# Patient Record
Sex: FEMALE | Race: WHITE | NOT HISPANIC OR LATINO | Employment: OTHER | URBAN - METROPOLITAN AREA
[De-identification: names, ages, dates, MRNs, and addresses within clinical notes are randomized per-mention and may not be internally consistent; named-entity substitution may affect disease eponyms.]

---

## 2024-08-08 ENCOUNTER — HOSPITAL ENCOUNTER (OUTPATIENT)
Dept: RADIOLOGY | Facility: MEDICAL CENTER | Age: 84
End: 2024-08-08

## 2024-08-08 ENCOUNTER — HOSPITAL ENCOUNTER (INPATIENT)
Facility: MEDICAL CENTER | Age: 84
LOS: 3 days | DRG: 419 | End: 2024-08-11
Attending: STUDENT IN AN ORGANIZED HEALTH CARE EDUCATION/TRAINING PROGRAM | Admitting: STUDENT IN AN ORGANIZED HEALTH CARE EDUCATION/TRAINING PROGRAM

## 2024-08-08 DIAGNOSIS — K80.50 CHOLEDOCHOLITHIASIS: ICD-10-CM

## 2024-08-08 PROCEDURE — 770006 HCHG ROOM/CARE - MED/SURG/GYN SEMI*

## 2024-08-08 SDOH — ECONOMIC STABILITY: TRANSPORTATION INSECURITY
IN THE PAST 12 MONTHS, HAS LACK OF RELIABLE TRANSPORTATION KEPT YOU FROM MEDICAL APPOINTMENTS, MEETINGS, WORK OR FROM GETTING THINGS NEEDED FOR DAILY LIVING?: NO

## 2024-08-08 SDOH — ECONOMIC STABILITY: TRANSPORTATION INSECURITY
IN THE PAST 12 MONTHS, HAS THE LACK OF TRANSPORTATION KEPT YOU FROM MEDICAL APPOINTMENTS OR FROM GETTING MEDICATIONS?: NO

## 2024-08-08 ASSESSMENT — PATIENT HEALTH QUESTIONNAIRE - PHQ9
3. TROUBLE FALLING OR STAYING ASLEEP OR SLEEPING TOO MUCH: NOT AT ALL
SUM OF ALL RESPONSES TO PHQ9 QUESTIONS 1 AND 2: 1
2. FEELING DOWN, DEPRESSED, IRRITABLE, OR HOPELESS: SEVERAL DAYS
7. TROUBLE CONCENTRATING ON THINGS, SUCH AS READING THE NEWSPAPER OR WATCHING TELEVISION: NOT AT ALL
SUM OF ALL RESPONSES TO PHQ QUESTIONS 1-9: 1
4. FEELING TIRED OR HAVING LITTLE ENERGY: NOT AT ALL
9. THOUGHTS THAT YOU WOULD BE BETTER OFF DEAD, OR OF HURTING YOURSELF: NOT AT ALL
8. MOVING OR SPEAKING SO SLOWLY THAT OTHER PEOPLE COULD HAVE NOTICED. OR THE OPPOSITE, BEING SO FIGETY OR RESTLESS THAT YOU HAVE BEEN MOVING AROUND A LOT MORE THAN USUAL: NOT AT ALL
1. LITTLE INTEREST OR PLEASURE IN DOING THINGS: NOT AT ALL
5. POOR APPETITE OR OVEREATING: NOT AT ALL
6. FEELING BAD ABOUT YOURSELF - OR THAT YOU ARE A FAILURE OR HAVE LET YOURSELF OR YOUR FAMILY DOWN: NOT AL ALL

## 2024-08-08 ASSESSMENT — COGNITIVE AND FUNCTIONAL STATUS - GENERAL
DAILY ACTIVITIY SCORE: 24
SUGGESTED CMS G CODE MODIFIER DAILY ACTIVITY: CH
SUGGESTED CMS G CODE MODIFIER MOBILITY: CH
MOBILITY SCORE: 24

## 2024-08-08 ASSESSMENT — LIFESTYLE VARIABLES
HAVE PEOPLE ANNOYED YOU BY CRITICIZING YOUR DRINKING: NO
DOES PATIENT WANT TO STOP DRINKING: NO
ALCOHOL_USE: NO
CONSUMPTION TOTAL: INCOMPLETE
ON A TYPICAL DAY WHEN YOU DRINK ALCOHOL HOW MANY DRINKS DO YOU HAVE: 0
EVER HAD A DRINK FIRST THING IN THE MORNING TO STEADY YOUR NERVES TO GET RID OF A HANGOVER: NO
AVERAGE NUMBER OF DAYS PER WEEK YOU HAVE A DRINK CONTAINING ALCOHOL: 0
HOW MANY TIMES IN THE PAST YEAR HAVE YOU HAD 5 OR MORE DRINKS IN A DAY: 0
HAVE YOU EVER FELT YOU SHOULD CUT DOWN ON YOUR DRINKING: NO

## 2024-08-08 ASSESSMENT — SOCIAL DETERMINANTS OF HEALTH (SDOH)

## 2024-08-08 ASSESSMENT — PAIN DESCRIPTION - PAIN TYPE: TYPE: ACUTE PAIN

## 2024-08-09 ENCOUNTER — ANESTHESIA EVENT (OUTPATIENT)
Dept: SURGERY | Facility: MEDICAL CENTER | Age: 84
DRG: 419 | End: 2024-08-09

## 2024-08-09 ENCOUNTER — ANESTHESIA (OUTPATIENT)
Dept: SURGERY | Facility: MEDICAL CENTER | Age: 84
DRG: 419 | End: 2024-08-09

## 2024-08-09 ENCOUNTER — APPOINTMENT (OUTPATIENT)
Dept: RADIOLOGY | Facility: MEDICAL CENTER | Age: 84
DRG: 419 | End: 2024-08-09
Attending: STUDENT IN AN ORGANIZED HEALTH CARE EDUCATION/TRAINING PROGRAM

## 2024-08-09 ENCOUNTER — APPOINTMENT (OUTPATIENT)
Dept: RADIOLOGY | Facility: MEDICAL CENTER | Age: 84
DRG: 419 | End: 2024-08-09
Attending: INTERNAL MEDICINE

## 2024-08-09 PROBLEM — K80.50 CHOLEDOCHOLITHIASIS: Status: ACTIVE | Noted: 2024-08-09

## 2024-08-09 PROBLEM — Z71.89 ACP (ADVANCE CARE PLANNING): Status: ACTIVE | Noted: 2024-08-09

## 2024-08-09 PROBLEM — K81.9 CHOLECYSTITIS: Status: ACTIVE | Noted: 2024-08-09

## 2024-08-09 LAB
ALBUMIN SERPL BCP-MCNC: 3.6 G/DL (ref 3.2–4.9)
ALBUMIN/GLOB SERPL: 1.1 G/DL
ALP SERPL-CCNC: 521 U/L (ref 30–99)
ALT SERPL-CCNC: 203 U/L (ref 2–50)
ANION GAP SERPL CALC-SCNC: 12 MMOL/L (ref 7–16)
AST SERPL-CCNC: 109 U/L (ref 12–45)
BASOPHILS # BLD AUTO: 0.7 % (ref 0–1.8)
BASOPHILS # BLD: 0.05 K/UL (ref 0–0.12)
BILIRUB SERPL-MCNC: 1.6 MG/DL (ref 0.1–1.5)
BUN SERPL-MCNC: 9 MG/DL (ref 8–22)
CALCIUM ALBUM COR SERPL-MCNC: 9.2 MG/DL (ref 8.5–10.5)
CALCIUM SERPL-MCNC: 8.9 MG/DL (ref 8.5–10.5)
CHLORIDE SERPL-SCNC: 109 MMOL/L (ref 96–112)
CO2 SERPL-SCNC: 19 MMOL/L (ref 20–33)
CREAT SERPL-MCNC: 0.71 MG/DL (ref 0.5–1.4)
EKG IMPRESSION: NORMAL
EOSINOPHIL # BLD AUTO: 0.14 K/UL (ref 0–0.51)
EOSINOPHIL NFR BLD: 1.8 % (ref 0–6.9)
ERYTHROCYTE [DISTWIDTH] IN BLOOD BY AUTOMATED COUNT: 44.4 FL (ref 35.9–50)
GFR SERPLBLD CREATININE-BSD FMLA CKD-EPI: 84 ML/MIN/1.73 M 2
GLOBULIN SER CALC-MCNC: 3.3 G/DL (ref 1.9–3.5)
GLUCOSE SERPL-MCNC: 110 MG/DL (ref 65–99)
HCT VFR BLD AUTO: 37.5 % (ref 37–47)
HGB BLD-MCNC: 12.1 G/DL (ref 12–16)
IMM GRANULOCYTES # BLD AUTO: 0.08 K/UL (ref 0–0.11)
IMM GRANULOCYTES NFR BLD AUTO: 1 % (ref 0–0.9)
LACTATE SERPL-SCNC: 1.3 MMOL/L (ref 0.5–2)
LIPASE SERPL-CCNC: 34 U/L (ref 11–82)
LYMPHOCYTES # BLD AUTO: 1.35 K/UL (ref 1–4.8)
LYMPHOCYTES NFR BLD: 17.6 % (ref 22–41)
MCH RBC QN AUTO: 28.7 PG (ref 27–33)
MCHC RBC AUTO-ENTMCNC: 32.3 G/DL (ref 32.2–35.5)
MCV RBC AUTO: 88.9 FL (ref 81.4–97.8)
MONOCYTES # BLD AUTO: 0.58 K/UL (ref 0–0.85)
MONOCYTES NFR BLD AUTO: 7.6 % (ref 0–13.4)
NEUTROPHILS # BLD AUTO: 5.47 K/UL (ref 1.82–7.42)
NEUTROPHILS NFR BLD: 71.3 % (ref 44–72)
NRBC # BLD AUTO: 0 K/UL
NRBC BLD-RTO: 0 /100 WBC (ref 0–0.2)
PLATELET # BLD AUTO: 260 K/UL (ref 164–446)
PMV BLD AUTO: 8.9 FL (ref 9–12.9)
POTASSIUM SERPL-SCNC: 4 MMOL/L (ref 3.6–5.5)
PROT SERPL-MCNC: 6.9 G/DL (ref 6–8.2)
RBC # BLD AUTO: 4.22 M/UL (ref 4.2–5.4)
SODIUM SERPL-SCNC: 140 MMOL/L (ref 135–145)
WBC # BLD AUTO: 7.7 K/UL (ref 4.8–10.8)

## 2024-08-09 PROCEDURE — 93010 ELECTROCARDIOGRAM REPORT: CPT | Performed by: INTERNAL MEDICINE

## 2024-08-09 PROCEDURE — 700105 HCHG RX REV CODE 258: Performed by: STUDENT IN AN ORGANIZED HEALTH CARE EDUCATION/TRAINING PROGRAM

## 2024-08-09 PROCEDURE — 99223 1ST HOSP IP/OBS HIGH 75: CPT | Performed by: STUDENT IN AN ORGANIZED HEALTH CARE EDUCATION/TRAINING PROGRAM

## 2024-08-09 PROCEDURE — 93005 ELECTROCARDIOGRAM TRACING: CPT | Performed by: GENERAL PRACTICE

## 2024-08-09 PROCEDURE — 87040 BLOOD CULTURE FOR BACTERIA: CPT

## 2024-08-09 PROCEDURE — 160036 HCHG PACU - EA ADDL 30 MINS PHASE I: Performed by: INTERNAL MEDICINE

## 2024-08-09 PROCEDURE — 160035 HCHG PACU - 1ST 60 MINS PHASE I: Performed by: INTERNAL MEDICINE

## 2024-08-09 PROCEDURE — 700102 HCHG RX REV CODE 250 W/ 637 OVERRIDE(OP)

## 2024-08-09 PROCEDURE — 83605 ASSAY OF LACTIC ACID: CPT

## 2024-08-09 PROCEDURE — 160048 HCHG OR STATISTICAL LEVEL 1-5: Performed by: INTERNAL MEDICINE

## 2024-08-09 PROCEDURE — C1769 GUIDE WIRE: HCPCS | Performed by: INTERNAL MEDICINE

## 2024-08-09 PROCEDURE — 700101 HCHG RX REV CODE 250: Performed by: STUDENT IN AN ORGANIZED HEALTH CARE EDUCATION/TRAINING PROGRAM

## 2024-08-09 PROCEDURE — 74181 MRI ABDOMEN W/O CONTRAST: CPT

## 2024-08-09 PROCEDURE — 83690 ASSAY OF LIPASE: CPT

## 2024-08-09 PROCEDURE — 43264 ERCP REMOVE DUCT CALCULI: CPT | Performed by: INTERNAL MEDICINE

## 2024-08-09 PROCEDURE — A9270 NON-COVERED ITEM OR SERVICE: HCPCS

## 2024-08-09 PROCEDURE — 700101 HCHG RX REV CODE 250: Performed by: ANESTHESIOLOGY

## 2024-08-09 PROCEDURE — 160002 HCHG RECOVERY MINUTES (STAT): Performed by: INTERNAL MEDICINE

## 2024-08-09 PROCEDURE — 99254 IP/OBS CNSLTJ NEW/EST MOD 60: CPT | Mod: 57 | Performed by: SURGERY

## 2024-08-09 PROCEDURE — 700105 HCHG RX REV CODE 258: Performed by: GENERAL PRACTICE

## 2024-08-09 PROCEDURE — 80053 COMPREHEN METABOLIC PANEL: CPT

## 2024-08-09 PROCEDURE — 36415 COLL VENOUS BLD VENIPUNCTURE: CPT

## 2024-08-09 PROCEDURE — C1889 IMPLANT/INSERT DEVICE, NOC: HCPCS | Performed by: INTERNAL MEDICINE

## 2024-08-09 PROCEDURE — 99253 IP/OBS CNSLTJ NEW/EST LOW 45: CPT | Mod: 25 | Performed by: INTERNAL MEDICINE

## 2024-08-09 PROCEDURE — 700105 HCHG RX REV CODE 258: Performed by: ANESTHESIOLOGY

## 2024-08-09 PROCEDURE — 74328 X-RAY BILE DUCT ENDOSCOPY: CPT

## 2024-08-09 PROCEDURE — 770006 HCHG ROOM/CARE - MED/SURG/GYN SEMI*

## 2024-08-09 PROCEDURE — 700111 HCHG RX REV CODE 636 W/ 250 OVERRIDE (IP): Performed by: STUDENT IN AN ORGANIZED HEALTH CARE EDUCATION/TRAINING PROGRAM

## 2024-08-09 PROCEDURE — 700111 HCHG RX REV CODE 636 W/ 250 OVERRIDE (IP): Performed by: ANESTHESIOLOGY

## 2024-08-09 PROCEDURE — 85025 COMPLETE CBC W/AUTO DIFF WBC: CPT

## 2024-08-09 PROCEDURE — 700117 HCHG RX CONTRAST REV CODE 255: Performed by: INTERNAL MEDICINE

## 2024-08-09 PROCEDURE — 160028 HCHG SURGERY MINUTES - 1ST 30 MINS LEVEL 3: Performed by: INTERNAL MEDICINE

## 2024-08-09 PROCEDURE — 0FC98ZZ EXTIRPATION OF MATTER FROM COMMON BILE DUCT, VIA NATURAL OR ARTIFICIAL OPENING ENDOSCOPIC: ICD-10-PCS | Performed by: INTERNAL MEDICINE

## 2024-08-09 PROCEDURE — 160009 HCHG ANES TIME/MIN: Performed by: INTERNAL MEDICINE

## 2024-08-09 RX ORDER — INDOMETHACIN 100 MG
SUPPOSITORY, RECTAL RECTAL
Status: COMPLETED
Start: 2024-08-09 | End: 2024-08-09

## 2024-08-09 RX ORDER — ALBUTEROL SULFATE 5 MG/ML
2.5 SOLUTION RESPIRATORY (INHALATION)
Status: DISCONTINUED | OUTPATIENT
Start: 2024-08-09 | End: 2024-08-09 | Stop reason: HOSPADM

## 2024-08-09 RX ORDER — HYDROMORPHONE HYDROCHLORIDE 1 MG/ML
1 INJECTION, SOLUTION INTRAMUSCULAR; INTRAVENOUS; SUBCUTANEOUS EVERY 4 HOURS PRN
Status: DISCONTINUED | OUTPATIENT
Start: 2024-08-09 | End: 2024-08-09

## 2024-08-09 RX ORDER — LABETALOL HYDROCHLORIDE 5 MG/ML
10 INJECTION, SOLUTION INTRAVENOUS EVERY 4 HOURS PRN
Status: DISCONTINUED | OUTPATIENT
Start: 2024-08-09 | End: 2024-08-11 | Stop reason: HOSPADM

## 2024-08-09 RX ORDER — ONDANSETRON 2 MG/ML
4 INJECTION INTRAMUSCULAR; INTRAVENOUS EVERY 4 HOURS PRN
Status: DISCONTINUED | OUTPATIENT
Start: 2024-08-09 | End: 2024-08-11 | Stop reason: HOSPADM

## 2024-08-09 RX ORDER — METRONIDAZOLE 500 MG/100ML
500 INJECTION, SOLUTION INTRAVENOUS EVERY 12 HOURS
Status: DISCONTINUED | OUTPATIENT
Start: 2024-08-09 | End: 2024-08-11 | Stop reason: HOSPADM

## 2024-08-09 RX ORDER — INDOMETHACIN 100 MG
100 SUPPOSITORY, RECTAL RECTAL ONCE
Status: COMPLETED | OUTPATIENT
Start: 2024-08-09 | End: 2024-08-09

## 2024-08-09 RX ORDER — OXYCODONE HCL 5 MG/5 ML
5 SOLUTION, ORAL ORAL
Status: DISCONTINUED | OUTPATIENT
Start: 2024-08-09 | End: 2024-08-09 | Stop reason: HOSPADM

## 2024-08-09 RX ORDER — OXYCODONE HYDROCHLORIDE 10 MG/1
10 TABLET ORAL
Status: DISCONTINUED | OUTPATIENT
Start: 2024-08-09 | End: 2024-08-11 | Stop reason: HOSPADM

## 2024-08-09 RX ORDER — OXYCODONE HCL 5 MG/5 ML
7.5 SOLUTION, ORAL ORAL
Status: DISCONTINUED | OUTPATIENT
Start: 2024-08-09 | End: 2024-08-09 | Stop reason: HOSPADM

## 2024-08-09 RX ORDER — ONDANSETRON 2 MG/ML
4 INJECTION INTRAMUSCULAR; INTRAVENOUS EVERY 4 HOURS PRN
Status: ACTIVE | OUTPATIENT
Start: 2024-08-09 | End: 2024-08-09

## 2024-08-09 RX ORDER — HYDROMORPHONE HYDROCHLORIDE 1 MG/ML
0.5 INJECTION, SOLUTION INTRAMUSCULAR; INTRAVENOUS; SUBCUTANEOUS
Status: DISCONTINUED | OUTPATIENT
Start: 2024-08-09 | End: 2024-08-11 | Stop reason: HOSPADM

## 2024-08-09 RX ORDER — ONDANSETRON 4 MG/1
4 TABLET, ORALLY DISINTEGRATING ORAL EVERY 4 HOURS PRN
Status: DISCONTINUED | OUTPATIENT
Start: 2024-08-09 | End: 2024-08-11 | Stop reason: HOSPADM

## 2024-08-09 RX ORDER — LIDOCAINE HYDROCHLORIDE 20 MG/ML
INJECTION, SOLUTION EPIDURAL; INFILTRATION; INTRACAUDAL; PERINEURAL PRN
Status: DISCONTINUED | OUTPATIENT
Start: 2024-08-09 | End: 2024-08-09 | Stop reason: SURG

## 2024-08-09 RX ORDER — SODIUM CHLORIDE 9 MG/ML
INJECTION, SOLUTION INTRAVENOUS CONTINUOUS
Status: DISCONTINUED | OUTPATIENT
Start: 2024-08-09 | End: 2024-08-10

## 2024-08-09 RX ORDER — ONDANSETRON 2 MG/ML
INJECTION INTRAMUSCULAR; INTRAVENOUS PRN
Status: DISCONTINUED | OUTPATIENT
Start: 2024-08-09 | End: 2024-08-09 | Stop reason: SURG

## 2024-08-09 RX ORDER — SODIUM CHLORIDE, SODIUM LACTATE, POTASSIUM CHLORIDE, CALCIUM CHLORIDE 600; 310; 30; 20 MG/100ML; MG/100ML; MG/100ML; MG/100ML
INJECTION, SOLUTION INTRAVENOUS
Status: DISCONTINUED | OUTPATIENT
Start: 2024-08-09 | End: 2024-08-09 | Stop reason: SURG

## 2024-08-09 RX ORDER — EPHEDRINE SULFATE 50 MG/ML
5 INJECTION, SOLUTION INTRAVENOUS
Status: DISCONTINUED | OUTPATIENT
Start: 2024-08-09 | End: 2024-08-09 | Stop reason: HOSPADM

## 2024-08-09 RX ORDER — HYDRALAZINE HYDROCHLORIDE 20 MG/ML
5 INJECTION INTRAMUSCULAR; INTRAVENOUS
Status: DISCONTINUED | OUTPATIENT
Start: 2024-08-09 | End: 2024-08-09 | Stop reason: HOSPADM

## 2024-08-09 RX ORDER — ONDANSETRON 2 MG/ML
4 INJECTION INTRAMUSCULAR; INTRAVENOUS EVERY 4 HOURS PRN
Status: DISCONTINUED | OUTPATIENT
Start: 2024-08-09 | End: 2024-08-09

## 2024-08-09 RX ORDER — ONDANSETRON 2 MG/ML
4 INJECTION INTRAMUSCULAR; INTRAVENOUS
Status: DISCONTINUED | OUTPATIENT
Start: 2024-08-09 | End: 2024-08-09 | Stop reason: HOSPADM

## 2024-08-09 RX ORDER — ROCURONIUM BROMIDE 10 MG/ML
INJECTION, SOLUTION INTRAVENOUS PRN
Status: DISCONTINUED | OUTPATIENT
Start: 2024-08-09 | End: 2024-08-09 | Stop reason: SURG

## 2024-08-09 RX ORDER — SODIUM CHLORIDE, SODIUM LACTATE, POTASSIUM CHLORIDE, CALCIUM CHLORIDE 600; 310; 30; 20 MG/100ML; MG/100ML; MG/100ML; MG/100ML
INJECTION, SOLUTION INTRAVENOUS CONTINUOUS
Status: ACTIVE | OUTPATIENT
Start: 2024-08-09 | End: 2024-08-09

## 2024-08-09 RX ORDER — OXYCODONE HYDROCHLORIDE 5 MG/1
5 TABLET ORAL
Status: DISCONTINUED | OUTPATIENT
Start: 2024-08-09 | End: 2024-08-11 | Stop reason: HOSPADM

## 2024-08-09 RX ORDER — ACETAMINOPHEN 325 MG/1
650 TABLET ORAL EVERY 6 HOURS PRN
Status: ACTIVE | OUTPATIENT
Start: 2024-08-09 | End: 2024-08-09

## 2024-08-09 RX ORDER — DIPHENHYDRAMINE HYDROCHLORIDE 50 MG/ML
6.25 INJECTION INTRAMUSCULAR; INTRAVENOUS
Status: DISCONTINUED | OUTPATIENT
Start: 2024-08-09 | End: 2024-08-09 | Stop reason: HOSPADM

## 2024-08-09 RX ORDER — SODIUM CHLORIDE, SODIUM LACTATE, POTASSIUM CHLORIDE, CALCIUM CHLORIDE 600; 310; 30; 20 MG/100ML; MG/100ML; MG/100ML; MG/100ML
INJECTION, SOLUTION INTRAVENOUS CONTINUOUS
Status: DISCONTINUED | OUTPATIENT
Start: 2024-08-09 | End: 2024-08-09 | Stop reason: HOSPADM

## 2024-08-09 RX ORDER — DEXAMETHASONE SODIUM PHOSPHATE 4 MG/ML
INJECTION, SOLUTION INTRA-ARTICULAR; INTRALESIONAL; INTRAMUSCULAR; INTRAVENOUS; SOFT TISSUE PRN
Status: DISCONTINUED | OUTPATIENT
Start: 2024-08-09 | End: 2024-08-09 | Stop reason: SURG

## 2024-08-09 RX ADMIN — ONDANSETRON 4 MG: 2 INJECTION INTRAMUSCULAR; INTRAVENOUS at 13:06

## 2024-08-09 RX ADMIN — PROPOFOL 140 MG: 10 INJECTION, EMULSION INTRAVENOUS at 13:04

## 2024-08-09 RX ADMIN — Medication 100 MG: at 12:25

## 2024-08-09 RX ADMIN — FENTANYL CITRATE 50 MCG: 50 INJECTION, SOLUTION INTRAMUSCULAR; INTRAVENOUS at 13:04

## 2024-08-09 RX ADMIN — LIDOCAINE HYDROCHLORIDE 40 MG: 20 INJECTION, SOLUTION EPIDURAL; INFILTRATION; INTRACAUDAL at 13:04

## 2024-08-09 RX ADMIN — ALBUTEROL SULFATE 2.5 MG: 2.5 SOLUTION RESPIRATORY (INHALATION) at 13:33

## 2024-08-09 RX ADMIN — METRONIDAZOLE 500 MG: 5 INJECTION, SOLUTION INTRAVENOUS at 16:57

## 2024-08-09 RX ADMIN — SODIUM CHLORIDE: 9 INJECTION, SOLUTION INTRAVENOUS at 01:16

## 2024-08-09 RX ADMIN — METRONIDAZOLE 500 MG: 5 INJECTION, SOLUTION INTRAVENOUS at 01:16

## 2024-08-09 RX ADMIN — SUGAMMADEX 200 MG: 100 INJECTION, SOLUTION INTRAVENOUS at 13:14

## 2024-08-09 RX ADMIN — SODIUM CHLORIDE: 9 INJECTION, SOLUTION INTRAVENOUS at 08:43

## 2024-08-09 RX ADMIN — SODIUM CHLORIDE, POTASSIUM CHLORIDE, SODIUM LACTATE AND CALCIUM CHLORIDE: 600; 310; 30; 20 INJECTION, SOLUTION INTRAVENOUS at 13:00

## 2024-08-09 RX ADMIN — ROCURONIUM BROMIDE 50 MG: 50 INJECTION, SOLUTION INTRAVENOUS at 13:04

## 2024-08-09 RX ADMIN — CEFTRIAXONE SODIUM 2000 MG: 10 INJECTION, POWDER, FOR SOLUTION INTRAVENOUS at 01:06

## 2024-08-09 RX ADMIN — DEXAMETHASONE SODIUM PHOSPHATE 8 MG: 4 INJECTION INTRA-ARTICULAR; INTRALESIONAL; INTRAMUSCULAR; INTRAVENOUS; SOFT TISSUE at 13:08

## 2024-08-09 ASSESSMENT — PAIN DESCRIPTION - PAIN TYPE
TYPE: SURGICAL PAIN
TYPE: SURGICAL PAIN
TYPE: ACUTE PAIN
TYPE: SURGICAL PAIN

## 2024-08-09 ASSESSMENT — LIFESTYLE VARIABLES: EVER FELT BAD OR GUILTY ABOUT YOUR DRINKING: NO

## 2024-08-09 ASSESSMENT — ENCOUNTER SYMPTOMS: ABDOMINAL PAIN: 1

## 2024-08-09 NOTE — ASSESSMENT & PLAN NOTE
18 minutes spent discussing goals of care with patient and children at bedside.  I explained to them that she will be admitted for MRCP for suspicion of choledocholithiasis.  We discussed CODE STATUS, patient states that she would like to be full code and to have everything done.

## 2024-08-09 NOTE — CONSULTS
CHIEF COMPLAINT: acute cholecystitis and choledocholithiasis     HISTORY OF PRESENT ILLNESS: The patient is an 84 year-old  elderly woman who presents to the Emergency Department a 5-day history of moderate right subcostal and right upper quadrant  abdominal pain. The pain is associated with nausea, vomiting, anorexia, fever, diaphoresis, and malaise. She denies any food intolerance or temporal relationship between symptoms and eating.   The patient denies any recent or intercurrent illness. The patient denies any history of previous abdominal surgery. She was diagnosed with choledocholithiasis.  She had an ERCP today.  Multiple gallstones were removed from her common bile duct.  She now needs her gallbladder removed.    PAST MEDICAL HISTORY:  has a past medical history of Disorder of thyroid.    PAST SURGICAL HISTORY:  has a past surgical history that includes no pertinent past surgical history.    ALLERGIES: No Known Allergies    CURRENT MEDICATIONS:    Home Medications       Reviewed by Juan Eldridge R.N. (Registered Nurse) on 08/09/24 at 0015  Med List Status: Complete     Medication Last Dose Status        Patient Denies Taking any Medications                         Audit from Redirected Encounters    **Home medications have not yet been reviewed for this encounter**         FAMILY HISTORY: family history is not on file.    SOCIAL HISTORY:  reports that she has never smoked. She has never used smokeless tobacco. She reports that she does not drink alcohol and does not use drugs.    REVIEW OF SYSTEMS: Comprehensive review of systems is negative with the exception of the aforementioned HPI, PMH, and PSH bullets in accordance with CMS guidelines.    PHYSICAL EXAMINATION:      Constitutional:     Vital Signs: /62   Pulse 73   Temp 36.4 °C (97.6 °F) (Temporal)   Resp 18   Wt 79 kg (174 lb 2.6 oz)   SpO2 94%    General Appearance: appears stated age.  HEENT: The pupils are equal,  round, and reactive to light bilaterally. The extraocular muscles are intact bilaterally. The sclera are non icteric. Nares and oropharynx are clear.   Neck: Supple. No adenopathy.  Respiratory:   Inspection: Unlabored respirations, no intercostal retractions, paradoxical motion, or accessory muscle use.   Auscultation: normal.  Cardiovascular:   Inspection: The skin is warm and dry.  Auscultation: Regular rate and rhythm.   Peripheral Pulses: Normal.   Abdomen:  Inspection: Abdominal inspection reveals no abrasions, contusions, lacerations or penetrating wounds.   Palpation: Palpation is remarkable for moderate tenderness in the right subcostal and right upper quadrant  region. No abdominal wall hernias.  Extremities:   Examination of the upper and lower extremities demonstrates no cyanosis edema or clubbing.  Neurologic:   Alert & oriented to person, time and place. Normal motor function. Normal sensory function. No focal deficits noted.    LABORATORY VALUES:   Recent Labs     08/09/24  0135   WBC 7.7   RBC 4.22   HEMOGLOBIN 12.1   HEMATOCRIT 37.5   MCV 88.9   MCH 28.7   MCHC 32.3   RDW 44.4   PLATELETCT 260   MPV 8.9*     Recent Labs     08/09/24  0136   SODIUM 140   POTASSIUM 4.0   CHLORIDE 109   CO2 19*   GLUCOSE 110*   BUN 9   CREATININE 0.71   CALCIUM 8.9     Recent Labs     08/09/24  0136   ASTSGOT 109*   ALTSGPT 203*   TBILIRUBIN 1.6*   ALKPHOSPHAT 521*   GLOBULIN 3.3            IMAGING:   [id number] DUCTS   Preliminary Result      Digitized intraoperative radiograph is submitted for review. This examination is not for diagnostic purpose but for guidance during a surgical procedure. Please see the patient's chart for full procedural details.         INTERPRETING LOCATION: [address] [place] [address], [facility]      [id number]   Final Result      1.  Cholelithiasis with stones present in the gallbladder neck.   2.  Multiple stones in the distal common bile duct without significant biliary dilation.   3.   Bilateral kidney cysts which no further evaluation is necessary.          ASSESSMENT AND PLAN:     * Choledocholithiasis- (present on admission)  Assessment & Plan   Patient had an ERCP completed today.  N.p.o. at midnight for laparoscopic cholecystectomy tomorrow.  Plan discussed with family at bedside.  All questions answered.        The patient has Grade II (moderate) acute cholecystitis and choledocholithiasis. Plan: Laparoscopic cholecystectomy.    The patient will be taken to the operating room for Laparoscopic cholecystectomy. The surgical conduct was discussed in detail. Potential complications including, but not limited to, infection, bleeding, damage to adjacent structures, bile duct injury, need to convert to an open procedure, and anesthetic complications were discussed. Questions were elicited and answered to the patient's satisfaction.     DISPOSITION: Medical evaluation and admission. The patient was admitted to the Medical Service prior to surgical consultation. [facility] [place] [facility] will follow.     ____________________________________     Marie English M.D.    DD: 8/9/2024  1:12 PM

## 2024-08-09 NOTE — CARE PLAN
The patient is Stable - Low risk of patient condition declining or worsening    Shift Goals  Clinical Goals: MRCP today, NPO, iv fluids  Patient Goals: Sleep  Family Goals: updates    Progress made toward(s) clinical / shift goals:  pt. Had MRCP and went ERCP as well today. Back on clear liquid diet post procedure and will be NPO at MN, PIV intact. No complaints of  pain. Post op vitals. Needs attended.      Problem: Knowledge Deficit - Standard  Goal: Patient and family/care givers will demonstrate understanding of plan of care, disease process/condition, diagnostic tests and medications  Outcome: Progressing     Problem: Pain - Standard  Goal: Alleviation of pain or a reduction in pain to the patient’s comfort goal  Outcome: Progressing     Problem: Fall Risk  Goal: Patient will remain free from falls  Outcome: Progressing       Patient is not progressing towards the following goals:

## 2024-08-09 NOTE — ANESTHESIA POSTPROCEDURE EVALUATION
Patient: Delfina Wahl    Procedure Summary       Date: 08/09/24 Room / Location: [facility] ROOM 26 / SURGERY SAME DAY [facility]    Anesthesia Start: 1300 Anesthesia Stop: 1320    Procedures:       ERCP (ENDOSCOPIC RETROGRADE CHOLANGIOPANCREATOGRAPHY) (Esophagus)      SPHINCTEROTOMY (Esophagus)      ERCP, WITH CALCULUS REMOVAL FROM BILE OR PANCREATIC DUCT (Esophagus) Diagnosis: (Choledocholithiasis)    Surgeons: Augusto Ontiveros M.D. Responsible Provider: Maximilian Garrido M.D.    Anesthesia Type: general ASA Status: 2            Final Anesthesia Type: general  Last vitals  BP   Blood Pressure : (!) 182/81    Temp   36.4 °C (97.5 °F)    Pulse   82   Resp   18    SpO2   95 %      Anesthesia Post Evaluation    Level of consciousness: sleepy but conscious                No notable events documented.     Nurse Pain Score: 0 (NPRS)

## 2024-08-09 NOTE — PROGRESS NOTES
Pt. Received back from PACU s/p ERCP. Awake alert and orientedx4. Rehooked to IVF. Started on clear liquids per order. Donato light placed within reach. Needs attended.

## 2024-08-09 NOTE — CARE PLAN
The patient is Stable - Low risk of patient condition declining or worsening    Shift Goals  Clinical Goals: Pain control  Patient Goals: Sleep  Family Goals: updates    Progress made toward(s) clinical / shift goals:  Patient A&Ox4, resting in bed comfortably with HOB elevated. Patient reports mild abdominal pain and had 1 episode of n/v. Patient IV patent and IVF infusing well with no issues. Patient placed on NPO for an abdominal imaging today. Bed locked to lowest position and reinforce the use of call light.     Problem: Knowledge Deficit - Standard  Goal: Patient and family/care givers will demonstrate understanding of plan of care, disease process/condition, diagnostic tests and medications  Outcome: Progressing     Problem: Pain - Standard  Goal: Alleviation of pain or a reduction in pain to the patient’s comfort goal  Outcome: Progressing     Problem: Fall Risk  Goal: Patient will remain free from falls  Outcome: Progressing       Patient is not progressing towards the following goals:

## 2024-08-09 NOTE — ASSESSMENT & PLAN NOTE
Patient had an ERCP completed today.  N.p.o. at midnight for laparoscopic cholecystectomy tomorrow.  Plan discussed with family at bedside.  All questions answered.  8/10 laparoscopic cholecystectomy

## 2024-08-09 NOTE — PROGRESS NOTES
4 Eyes Skin Assessment Completed by Juan, DELMA and , RN.    Head WDL  Ears WDL  Nose WDL  Mouth WDL  Neck WDL  Breast/Chest WDL  Shoulder Blades WDL  Spine WDL  (R) Arm/Elbow/Hand Age spots  (L) Arm/Elbow/Hand Age spots  Abdomen WDL  Groin WDL  Scrotum/Coccyx/Buttocks WDL  (R) Leg WDL  (L) Leg WDL  (R) Heel/Foot/Toe WDL  (L) Heel/Foot/Toe WDL          Devices In Places NA      Interventions In Place Pillows    Possible Skin Injury No    Pictures Uploaded Into Epic Yes  Wound Consult Placed N/A  RN Wound Prevention Protocol Ordered No

## 2024-08-09 NOTE — ANESTHESIA TIME REPORT
Anesthesia Start and Stop Event Times       Date Time Event    8/9/2024 1250 Ready for Procedure     1300 Anesthesia Start     1320 Anesthesia Stop          Responsible Staff  08/09/24      Name Role Begin End    Maximilian Garrido M.D. Anesth 1300 1320          Overtime Reason:  no overtime (within assigned shift)    Comments:

## 2024-08-09 NOTE — ANESTHESIA PREPROCEDURE EVALUATION
Case: [id number] Date/Time: 08/09/24 1250    Procedure: ERCP (ENDOSCOPIC RETROGRADE CHOLANGIOPANCREATOGRAPHY) (Esophagus)    Anesthesia type: General    Pre-op diagnosis: Choledocholithiasis    Location: [facility] ROOM 26 / SURGERY SAME DAY [facility]    Surgeons: Augusto Ontiveros M.D.          Past Medical History:   Diagnosis Date    Disorder of thyroid        Relevant Problems   Other   (positive) Cholecystitis   (positive) Choledocholithiasis       Physical Exam    Airway   Mallampati: II  TM distance: >3 FB  Neck ROM: full       Cardiovascular - normal exam  Rhythm: regular  Rate: normal  (-) murmur     Dental - normal exam  (+) upper dentures, lower dentures           Pulmonary - normal exam  Breath sounds clear to auscultation     Abdominal    Neurological - normal exam                   Anesthesia Plan    ASA 2       Plan - general       Airway plan will be ETT          Induction: intravenous    Postoperative Plan: Postoperative administration of opioids is intended.    Pertinent diagnostic labs and testing reviewed    Informed Consent:    Anesthetic plan and risks discussed with patient.

## 2024-08-09 NOTE — CONSULTS
Date of Consultation:  8/9/2024    Patient: : Delfina Wahl  MRN: [id number]    Referring Physician:  Dr Mcgrath      GI:Augusto Ontiveros M.D.     Reason for Consultation: Choledocholithiasis    History of Present Illness:   84-year-old female with epigastric and right upper quadrant abdominal pain.  Elevated biochemical liver test.  Underwent MRCP today.  Has choledocholithiasis.  Request for ERCP.      Past Medical History:   Diagnosis Date    Disorder of thyroid        Past Surgical History:   Procedure Laterality Date    NO PERTINENT PAST SURGICAL HISTORY         History reviewed. No pertinent family history.    Social History     Socioeconomic History    Marital status: Unknown   Tobacco Use    Smoking status: Never    Smokeless tobacco: Never   Vaping Use    Vaping status: Never Used   Substance and Sexual Activity    Alcohol use: Never    Drug use: Never     Social Determinants of Health     Food Insecurity: No Food Insecurity (8/8/2024)    Hunger Vital Sign     Worried About Running Out of Food in the Last Year: Never true     Ran Out of Food in the Last Year: Never true   Transportation Needs: No Transportation Needs (8/8/2024)    PRAPARE - Transportation     Lack of Transportation (Medical): No     Lack of Transportation (Non-Medical): No   Intimate Partner Violence: Not At Risk (8/8/2024)    Humiliation, Afraid, Rape, and Kick questionnaire     Fear of Current or Ex-Partner: No     Emotionally Abused: No     Physically Abused: No     Sexually Abused: No   Housing Stability: Low Risk  (8/8/2024)    Housing Stability Vital Sign     Unable to Pay for Housing in the Last Year: No     Number of Places Lived in the Last Year: 1     Unstable Housing in the Last Year: No       Current Meds (name, sig, last dose):     Current Facility-Administered Medications:     NS    labetalol    ondansetron    ondansetron    cefTRIAXone (ROCEPHIN) IV    metroNIDAZOLE (FLAGYL) IV    Pharmacy Consult Request    oxyCODONE  immediate-release **OR** oxyCODONE immediate-release **OR** HYDROmorphone      ROS  10 systems reviewed and are negative unless otherwise noted in history of present illness.    Physical Exam:  Vitals:    08/08/24 2351 08/09/24 0347 08/09/24 0855   BP: (!) 169/73 128/71 (!) 151/82   Pulse: 85 82 77   Resp: 18 18 18   Temp: 37.3 °C (99.2 °F) 37.2 °C (98.9 °F) 36.6 °C (97.9 °F)   TempSrc: Temporal Temporal Temporal   SpO2: 95% 95% 94%   Weight: 79 kg (174 lb 2.6 oz)         Physical Exam  Vitals reviewed.   Cardiovascular:      Rate and Rhythm: Normal rate.      Heart sounds: Normal heart sounds.   Pulmonary:      Effort: Pulmonary effort is normal.      Breath sounds: Normal breath sounds.   Abdominal:      General: Bowel sounds are normal.      Palpations: Abdomen is soft.      Tenderness: There is no abdominal tenderness. There is no guarding or rebound.   Skin:     General: Skin is warm.   Neurological:      General: No focal deficit present.      Mental Status: She is alert. Mental status is at baseline.   Psychiatric:         Mood and Affect: Mood normal.         Behavior: Behavior normal.         Judgment: Judgment normal.           Labs:  Recent Labs     08/09/24  0136   SODIUM 140   POTASSIUM 4.0   CHLORIDE 109   CO2 19*   BUN 9   CREATININE 0.71   CALCIUM 8.9     Recent Labs     08/09/24  0136 08/09/24  1028   ALTSGPT 203*  --    ASTSGOT 109*  --    ALKPHOSPHAT 521*  --    TBILIRUBIN 1.6*  --    LIPASE  --  34   GLUCOSE 110*  --      Recent Labs     08/09/24  0135 08/09/24  0136   WBC 7.7  --    NEUTSPOLYS 71.30  --    LYMPHOCYTES 17.60*  --    MONOCYTES 7.60  --    EOSINOPHILS 1.80  --    BASOPHILS 0.70  --    ASTSGOT  --  109*   ALTSGPT  --  203*   ALKPHOSPHAT  --  521*   TBILIRUBIN  --  1.6*     Recent Labs     08/09/24  0135   RBC 4.22   HEMOGLOBIN 12.1   HEMATOCRIT 37.5   PLATELETCT 260     Recent Results (from the past 24 hour(s))   CBC WITH DIFFERENTIAL    Collection Time: 08/09/24  1:35 AM   Result  Value Ref Range    WBC 7.7 4.8 - 10.8 K/uL    RBC 4.22 4.20 - 5.40 M/uL    Hemoglobin 12.1 12.0 - 16.0 g/dL    Hematocrit 37.5 37.0 - 47.0 %    MCV 88.9 81.4 - 97.8 fL    MCH 28.7 27.0 - 33.0 pg    MCHC 32.3 32.2 - 35.5 g/dL    RDW 44.4 35.9 - 50.0 fL    Platelet Count 260 164 - 446 K/uL    MPV 8.9 (L) 9.0 - 12.9 fL    Neutrophils-Polys 71.30 44.00 - 72.00 %    Lymphocytes 17.60 (L) 22.00 - 41.00 %    Monocytes 7.60 0.00 - 13.40 %    Eosinophils 1.80 0.00 - 6.90 %    Basophils 0.70 0.00 - 1.80 %    Immature Granulocytes 1.00 (H) 0.00 - 0.90 %    Nucleated RBC 0.00 0.00 - 0.20 /100 WBC    Neutrophils (Absolute) 5.47 1.82 - 7.42 K/uL    Lymphs (Absolute) 1.35 1.00 - 4.80 K/uL    Monos (Absolute) 0.58 0.00 - 0.85 K/uL    Eos (Absolute) 0.14 0.00 - 0.51 K/uL    Baso (Absolute) 0.05 0.00 - 0.12 K/uL    Immature Granulocytes (abs) 0.08 0.00 - 0.11 K/uL    NRBC (Absolute) 0.00 K/uL   BLOOD CULTURE    Collection Time: 08/09/24  1:35 AM    Specimen: Peripheral; Blood   Result Value Ref Range    Significant Indicator NEG     Source BLD     Site PERIPHERAL     Culture Result       No Growth  Note: Blood cultures are incubated for 5 days and  are monitored continuously.Positive blood cultures  are called to the RN and reported as soon as  they are identified.     LACTIC ACID    Collection Time: 08/09/24  1:35 AM   Result Value Ref Range    Lactic Acid 1.3 0.5 - 2.0 mmol/L   Comp Metabolic Panel    Collection Time: 08/09/24  1:36 AM   Result Value Ref Range    Sodium 140 135 - 145 mmol/L    Potassium 4.0 3.6 - 5.5 mmol/L    Chloride 109 96 - 112 mmol/L    Co2 19 (L) 20 - 33 mmol/L    Anion Gap 12.0 7.0 - 16.0    Glucose 110 (H) 65 - 99 mg/dL    Bun 9 8 - 22 mg/dL    Creatinine 0.71 0.50 - 1.40 mg/dL    Calcium 8.9 8.5 - 10.5 mg/dL    Correct Calcium 9.2 8.5 - 10.5 mg/dL    AST(SGOT) 109 (H) 12 - 45 U/L    ALT(SGPT) 203 (H) 2 - 50 U/L    Alkaline Phosphatase 521 (H) 30 - 99 U/L    Total Bilirubin 1.6 (H) 0.1 - 1.5 mg/dL     Albumin 3.6 3.2 - 4.9 g/dL    Total Protein 6.9 6.0 - 8.2 g/dL    Globulin 3.3 1.9 - 3.5 g/dL    A-G Ratio 1.1 g/dL   BLOOD CULTURE    Collection Time: 24  1:36 AM    Specimen: Peripheral; Blood   Result Value Ref Range    Significant Indicator NEG     Source BLD     Site PERIPHERAL     Culture Result       No Growth  Note: Blood cultures are incubated for 5 days and  are monitored continuously.Positive blood cultures  are called to the RN and reported as soon as  they are identified.     ESTIMATED GFR    Collection Time: 24  1:36 AM   Result Value Ref Range    GFR (CKD-EPI) 84 >60 mL/min/1.73 m 2   EKG    Collection Time: 24  8:22 AM   Result Value Ref Range    Report       [facility]    Test Date:  2024  Pt Name:    MARIA INES LEPE              Department: 61  MRN:        [id number]                      Room:       [facility]  Gender:     Female                       Technician: MARIA DEL ROSARIO  :        [id number]                   Requested By:JOSE RAMON KLEIN  Order #:    [id number]                    Reading MD:    Measurements  Intervals                                Axis  Rate:       80                           P:          29  IN:         164                          QRS:        4  QRSD:       83                           T:          65  QT:         376  QTc:        434    Interpretive Statements  Sinus rhythm  No previous ECG available for comparison     LIPASE    Collection Time: 24 10:28 AM   Result Value Ref Range    Lipase 34 11 - 82 U/L         Imaging:  [id number]  Narrative: 2024 10:00 AM    HISTORY/REASON FOR EXAM:  choledocholithiasis.    TECHNIQUE/EXAM DESCRIPTION: Magnetic resonance cholangiopancreatography.    Magnetic resonance cholangiopancreatography was performed on with axial, coronal, and sagittal thin and thick section heavily T2-weighted sequences.    3-D cholangiographic multiplanar maximum intensity projection (MIP) images were created on a  workstation.    The study was performed on a [employer] 1.5 Danielle MRI scanner.    COMPARISON: Outside CT abdomen and pelvis 8/8/2024    FINDINGS:  The liver is unremarkable.  The spleen is unremarkable.  Adrenal glands are unremarkable.  The pancreas is unremarkable.  LEFT kidney shows large T2 hypointense lesion measuring 7.7 cm consistent with cyst.  Small cortical cysts in both kidneys.  Gallbladder shows signal void indicating stones in the gallbladder neck.  Common bile duct measures 7 mm.  Signal voids present in the distal common bile duct consistent with stones.  Pancreatic duct is not dilated.  Degenerative change of thoracolumbar spine with mild shift curvature.  Impression: 1.  Cholelithiasis with stones present in the gallbladder neck.  2.  Multiple stones in the distal common bile duct without significant biliary dilation.  3.  Bilateral kidney cysts which no further evaluation is necessary.        MDM Data Review:  -Records reviewed and summarized in current documentation  -I personally reviewed and interpreted the laboratory results  -I personally reviewed the radiology images  -I have personally reviewed medications    Hospital Problem List:  Active Hospital Problems    Diagnosis     Choledocholithiasis [K80.50]     ACP (advance care planning) [Z71.89]     Cholecystitis [K81.9]        Impressions:  84-year-old female with choledocholithiasis.  I have recommended ERCP.  The risk, benefits, and alternatives were discussed in detail. Risks include bowel perforation, acute pancreatitis, procedure related bleeding event, infection, inability to safely complete the exam, sedation related complications. The patient, understanding the discussion, consents to proceed forward.        Recommendations:  Indomethacin preoperatively  ERCP today    Discussed patient condition and risk of morbidity and/or mortality with hospitalist.  Time spent in chart review, counseling, coordination of care: 45 min

## 2024-08-09 NOTE — PROGRESS NOTES
Hospital Medicine Daily Progress Note    Date of Service  8/9/2024    Chief Complaint  Delfina Wahl is a 84 y.o. female admitted 8/8/2024 with abdominal pain    Hospital Course  This is an 84-year-old female with no significant past medical history who was transferred from outside facility on 8/8 with abdominal pain.    At outside facility ultrasound performed showing sludge, and stones, and possible stone within the gallbladder neck. CTAP showing gallbladder distended, subtle osteolytic lesions in the T12 and L2 vertebral bodies. T. bili of 3.1.  Transfer request for MRCP.    Interval Problem Update  Patient with concerns for choledocholithiasis, continue with Rocephin and Flagyl.  MRCP pending.  Pending results may require GI and general surgery consult.    I have discussed this patient's plan of care and discharge plan at IDT rounds today with Case Management, Nursing, Nursing leadership, and other members of the IDT team.    Consultants/Specialty  None    Code Status  Full Code    Disposition  Patient is not medically clear to discharge home.    I have placed the appropriate orders for post-discharge needs.    Review of Systems  Review of Systems   Gastrointestinal:  Positive for abdominal pain.   All other systems reviewed and are negative.       Physical Exam  Temp:  [37.2 °C (98.9 °F)-37.3 °C (99.2 °F)] 37.2 °C (98.9 °F)  Pulse:  [82-85] 82  Resp:  [18] 18  BP: (128-169)/(71-73) 128/71  SpO2:  [95 %] 95 %    Physical Exam  Vitals and nursing note reviewed.   Constitutional:       General: She is not in acute distress.     Appearance: Normal appearance.   HENT:      Head: Normocephalic and atraumatic.      Mouth/Throat:      Mouth: Mucous membranes are moist.      Pharynx: No oropharyngeal exudate.   Eyes:      Extraocular Movements: Extraocular movements intact.      Pupils: Pupils are equal, round, and reactive to light.   Cardiovascular:      Rate and Rhythm: Normal rate and regular rhythm.      Pulses:  Normal pulses.      Heart sounds: No murmur heard.     No friction rub. No gallop.   Pulmonary:      Effort: Pulmonary effort is normal. No respiratory distress.      Breath sounds: No wheezing, rhonchi or rales.   Abdominal:      General: Bowel sounds are normal. There is no distension.      Palpations: Abdomen is soft. There is no mass.      Tenderness: There is abdominal tenderness.   Musculoskeletal:         General: No swelling or tenderness. Normal range of motion.      Cervical back: Normal range of motion. No rigidity. No muscular tenderness.      Right lower leg: No edema.      Left lower leg: No edema.   Skin:     General: Skin is warm and dry.      Capillary Refill: Capillary refill takes less than 2 seconds.      Findings: No erythema or rash.   Neurological:      General: No focal deficit present.      Mental Status: She is alert and oriented to person, place, and time.      Motor: No weakness.      Gait: Gait normal.         Fluids    Intake/Output Summary (Last 24 hours) at 8/9/2024 0812  Last data filed at 8/9/2024 0613  Gross per 24 hour   Intake 572.56 ml   Output 25 ml   Net 547.56 ml       Laboratory  Recent Labs     08/09/24  0135   WBC 7.7   RBC 4.22   HEMOGLOBIN 12.1   HEMATOCRIT 37.5   MCV 88.9   MCH 28.7   MCHC 32.3   RDW 44.4   PLATELETCT 260   MPV 8.9*     Recent Labs     08/09/24  0136   SODIUM 140   POTASSIUM 4.0   CHLORIDE 109   CO2 19*   GLUCOSE 110*   BUN 9   CREATININE 0.71   CALCIUM 8.9                   Imaging  [id number]    (Results Pending)        Assessment/Plan  * Choledocholithiasis- (present on admission)  Assessment & Plan  Patient presented for right upper quadrant pain chills nausea and vomiting.  Ultrasound performed showing sludge, and stones, and possible stone within the gallbladder neck. CTAP showing gallbladder distended,   Transaminitis and T. bili at 3.1  Ceftriaxone Flagyl  MRCP pending, GI and surg consult   Fluids      ACP (advance care  planning)  Assessment & Plan  18 minutes spent discussing goals of care with patient and children at bedside.  I explained to them that she will be admitted for MRCP for suspicion of choledocholithiasis.  We discussed CODE STATUS, patient states that she would like to be full code and to have everything done.         VTE prophylaxis: SCDs    I have performed a physical exam and reviewed and updated ROS and Plan today (8/9/2024). In review of yesterday's note (8/8/2024), there are no changes except as documented above.

## 2024-08-09 NOTE — H&P
Hospital Medicine History & Physical Note    Date of Service  8/9/2024    Primary Care Physician  No primary care provider on file.    Consultants  None    Code Status  Full Code    Chief Complaint  Transaminitis    History of Presenting Illness  Delfina Wahl is a 84 y.o. female who presented 8/8/2024 with right upper quadrant abdominal pain.  Patient has no medical history and does not take any medications.  She presents for right upper quadrant pain, nausea, 1 episode of vomiting, subjective fever and chills for the last 5 days.  Symptoms have been getting worse, she presented to outside facility hospital for evaluation.    At outside hospital, , , alkaline phosphatase 508, total bilirubin 3.1, lipase 122. US abdomen showing 1 cm stone in the neck of the gallbladder. CTAP showing gallbladder distended, subtle osteolytic lesions in the T12 and L2 vertebral bodies    I discussed the plan of care with patient.    Review of Systems  ROS    Past Medical History   has no past medical history on file.    Surgical History   has no past surgical history on file.     Family History  family history is not on file.   Family history reviewed with patient. There is no family history that is pertinent to the chief complaint.     Social History       Allergies  No Known Allergies    Medications  None       Physical Exam  Temp:  [37.3 °C (99.2 °F)] 37.3 °C (99.2 °F)  Pulse:  [85] 85  Resp:  [18] 18  BP: (169)/(73) 169/73  SpO2:  [95 %] 95 %  Blood Pressure : (!) 169/73 (Rn aware)   Temperature: 37.3 °C (99.2 °F)   Pulse: 85   Respiration: 18   Pulse Oximetry: 95 %       Physical Exam  Constitutional:       Appearance: Normal appearance. She is obese.   HENT:      Head: Normocephalic.      Nose: Nose normal.      Mouth/Throat:      Mouth: Mucous membranes are moist.   Eyes:      Conjunctiva/sclera: Conjunctivae normal.   Cardiovascular:      Rate and Rhythm: Normal rate and regular rhythm.   Pulmonary:      Breath  "sounds: Normal breath sounds.   Abdominal:      General: Abdomen is flat. Bowel sounds are normal.      Palpations: Abdomen is soft.      Comments: Endorses pain upon palpation of right upper quadrant   Musculoskeletal:         General: Normal range of motion.      Cervical back: Neck supple.   Skin:     General: Skin is warm.   Neurological:      General: No focal deficit present.      Mental Status: She is alert and oriented to person, place, and time. Mental status is at baseline.   Psychiatric:         Mood and Affect: Mood normal.         Behavior: Behavior normal.         Thought Content: Thought content normal.         Judgment: Judgment normal.         Laboratory:          No results for input(s): \"ALTSGPT\", \"ASTSGOT\", \"ALKPHOSPHAT\", \"TBILIRUBIN\", \"DBILIRUBIN\", \"GAMMAGT\", \"AMYLASE\", \"LIPASE\", \"ALB\", \"PREALBUMIN\", \"GLUCOSE\" in the last 72 hours.      No results for input(s): \"NTPROBNP\" in the last 72 hours.      No results for input(s): \"TROPONINT\" in the last 72 hours.    Imaging:  [id number]    (Results Pending)       no X-Ray or EKG requiring interpretation    Assessment/Plan:  Justification for Admission Status  I anticipate this patient will require at least two midnights for appropriate medical management, necessitating inpatient admission because patient has suspected choledocholithiasis    Patient will need a Med/Surg bed on MEDICAL service .  The need is secondary to choledocholithiasis.    * Choledocholithiasis- (present on admission)  Assessment & Plan  Patient presented for right upper quadrant pain chills nausea and vomiting.  Found to have transaminitis at outside hospital  MRCP ordered  Fluids  Ceftriaxone Flagyl  Dilaudid as needed    ACP (advance care planning)  Assessment & Plan  18 minutes spent discussing goals of care with patient and children at bedside.  I explained to them that she will be admitted for MRCP for suspicion of choledocholithiasis.  We discussed CODE STATUS, patient " states that she would like to be full code and to have everything done.        VTE prophylaxis: pharmacologic prophylaxis contraindicated due to possible procedure in the morning

## 2024-08-09 NOTE — OP REPORT
PreOp Diagnosis: Choledocholithiasis      PostOp Diagnosis: Choledocholithiasis      Procedure(s):  ERCP (ENDOSCOPIC RETROGRADE CHOLANGIOPANCREATOGRAPHY) - Wound Class: Clean    Surgeon(s):  Augusto Ontiveros M.D.    Anesthesiologist/Type of Anesthesia:  Anesthesiologist: Maximilian Garrido M.D./General    Surgical Staff:  Circulator: Yolie Hernandez R.N.  Endoscopy Technician: Cristo Andesren; Liza S Reyes  Radiology Technologist: Robe Swann  Endoscopy Nurse: Radha Shafer R.N.    Specimens removed if any:  * No specimens in log *      CONSENT: The risks, benefits and alternatives of the procedure were discussed in detail. The risks include and are not limited to bleeding, infection, perforation, missed lesions, and sedations risks (cardiopulmonary compromise and allergic reaction to medications).    DESCRIPTION:   The patient presented to the operating room.  A time out was performed prior to beginning the procedure.   The patient was placed in the supine position.   Patient was sedated by anesthesia: GETA.  Indomethacin administered 100 mg per rectum preoperatively.  Patient on antibiotics preoperatively.    OPERATIVE FINDINGS:    Endoscope advanced under oblique visualization the second portion of the duodenum.  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  The papilla was just distal to the diverticulum.  Otherwise normal in appearance.  The common bile duct was cannulated.  Multiple stones were identified.  The cystic duct and gallbladder did not opacify.  1 cm biliary sphincterotomy pursued.  Multiple balloon sweeps from the bifurcation revealed several stones.  No stones remained.      Blood loss: None    The patient tolerated the procedure well.      There were no immediate complications.    IMPRESSION:  Choledocholithiasis successfully extracted after biliary sphincterotomy.      RECOMMENDATIONS:  Anticipate no further GI interventions.  Consult surgery for consideration of cholecystectomy.  Clear  liquid diet today.  Watch for complications.  [facility] once PAR criteria satisfied.

## 2024-08-09 NOTE — ANESTHESIA PROCEDURE NOTES
Airway    Date/Time: 8/9/2024 1:04 PM    Performed by: Maximilian Garrido M.D.  Authorized by: Maximilian Garrido M.D.    Location:  OR  Urgency:  Elective  Difficult Airway: No    Indications for Airway Management:  Anesthesia      Spontaneous Ventilation: absent    Sedation Level:  Deep  Preoxygenated: Yes    Patient Position:  Sniffing  Mask Difficulty Assessment:  0 - not attempted  Final Airway Type:  Endotracheal airway  Final Endotracheal Airway:  ETT  Cuffed: Yes    Technique Used for Successful ETT Placement:  Direct laryngoscopy    Insertion Site:  Oral  Blade Type:  [place]  Laryngoscope Blade/Videolaryngoscope Blade Size:  3  ETT Size (mm):  6.5  Measured from:  Teeth  ETT to Teeth (cm):  20  Placement Verified by: auscultation and capnometry    Cormack-Lehane Classification:  Grade I - full view of glottis  Number of Attempts at Approach:  1

## 2024-08-09 NOTE — OR NURSING
1319 - Pt to PACU from OR. Report from anesthesia and OR RN. On 6L O2 via mask. Respirations even and unlabored. VSS. Oral airway d/c'd on arrival to PACU.    1333 - Albuterol treatment given per MAR.    1445 - Pt coughing less. Tolerating PO fluids. Denies pain or nausea.    1409 - Report given to Josie Dennis S6. All questions answered, verbalizes understanding. Patient transport request placed.     1421 - Pt transported back to room via gurney with room air and all personal belongings. Accompanied by transport.

## 2024-08-09 NOTE — HOSPITAL COURSE
This is an 84-year-old female with no significant past medical history who was transferred from outside facility on 8/8 with abdominal pain.    At outside facility ultrasound performed showing sludge, and stones, and possible stone within the gallbladder neck. CTAP showing gallbladder distended, subtle osteolytic lesions in the T12 and L2 vertebral bodies. T. bili of 3.1.  MRCP at our facility performed, patient noted to have  stone in the gallbladder neck.  GI consulted for ERCP, sphincterotomy, balloon sweep, all stones were removed 8/9.  General surgery consulted patient underwent laparoscopic cholecystectomy 8/10.    Patient is tolerating oral intake, good urine output, had a bowel movement on day of surgery.  She is to follow-up with surgical team in about 1 to 2 weeks for postoperative wound check.

## 2024-08-09 NOTE — ASSESSMENT & PLAN NOTE
Patient presented for right upper quadrant pain chills nausea and vomiting.  Ultrasound performed showing sludge, and stones, and possible stone within the gallbladder neck. CTAP showing gallbladder distended,   Transaminitis and T. bili at 3.1  Ceftriaxone Flagyl  MRCP confirmed stone in GB neck  GI consulted for ERCP, sphincterotomy, balloon sweep, all stones were removed 8/9.  General surgery consulted patient underwent laparoscopic cholecystectomy 8/10.  Fluids

## 2024-08-10 ENCOUNTER — ANESTHESIA (OUTPATIENT)
Dept: SURGERY | Facility: MEDICAL CENTER | Age: 84
DRG: 419 | End: 2024-08-10

## 2024-08-10 ENCOUNTER — ANESTHESIA EVENT (OUTPATIENT)
Dept: SURGERY | Facility: MEDICAL CENTER | Age: 84
DRG: 419 | End: 2024-08-10

## 2024-08-10 LAB
ALBUMIN SERPL BCP-MCNC: 3.1 G/DL (ref 3.2–4.9)
ALBUMIN/GLOB SERPL: 1 G/DL
ALP SERPL-CCNC: 398 U/L (ref 30–99)
ALT SERPL-CCNC: 133 U/L (ref 2–50)
ANION GAP SERPL CALC-SCNC: 12 MMOL/L (ref 7–16)
AST SERPL-CCNC: 48 U/L (ref 12–45)
BASOPHILS # BLD AUTO: 0.3 % (ref 0–1.8)
BASOPHILS # BLD: 0.02 K/UL (ref 0–0.12)
BILIRUB SERPL-MCNC: 0.6 MG/DL (ref 0.1–1.5)
BUN SERPL-MCNC: 11 MG/DL (ref 8–22)
CALCIUM ALBUM COR SERPL-MCNC: 9.1 MG/DL (ref 8.5–10.5)
CALCIUM SERPL-MCNC: 8.4 MG/DL (ref 8.5–10.5)
CHLORIDE SERPL-SCNC: 105 MMOL/L (ref 96–112)
CO2 SERPL-SCNC: 18 MMOL/L (ref 20–33)
CREAT SERPL-MCNC: 0.71 MG/DL (ref 0.5–1.4)
EOSINOPHIL # BLD AUTO: 0 K/UL (ref 0–0.51)
EOSINOPHIL NFR BLD: 0 % (ref 0–6.9)
ERYTHROCYTE [DISTWIDTH] IN BLOOD BY AUTOMATED COUNT: 42.7 FL (ref 35.9–50)
GFR SERPLBLD CREATININE-BSD FMLA CKD-EPI: 84 ML/MIN/1.73 M 2
GLOBULIN SER CALC-MCNC: 3.2 G/DL (ref 1.9–3.5)
GLUCOSE SERPL-MCNC: 206 MG/DL (ref 65–99)
HCT VFR BLD AUTO: 36 % (ref 37–47)
HGB BLD-MCNC: 11.5 G/DL (ref 12–16)
IMM GRANULOCYTES # BLD AUTO: 0.26 K/UL (ref 0–0.11)
IMM GRANULOCYTES NFR BLD AUTO: 3.4 % (ref 0–0.9)
INR PPP: 1.08 (ref 0.87–1.13)
LYMPHOCYTES # BLD AUTO: 0.72 K/UL (ref 1–4.8)
LYMPHOCYTES NFR BLD: 9.3 % (ref 22–41)
MCH RBC QN AUTO: 28.1 PG (ref 27–33)
MCHC RBC AUTO-ENTMCNC: 31.9 G/DL (ref 32.2–35.5)
MCV RBC AUTO: 88 FL (ref 81.4–97.8)
MONOCYTES # BLD AUTO: 0.23 K/UL (ref 0–0.85)
MONOCYTES NFR BLD AUTO: 3 % (ref 0–13.4)
NEUTROPHILS # BLD AUTO: 6.5 K/UL (ref 1.82–7.42)
NEUTROPHILS NFR BLD: 84 % (ref 44–72)
NRBC # BLD AUTO: 0 K/UL
NRBC BLD-RTO: 0 /100 WBC (ref 0–0.2)
PLATELET # BLD AUTO: 259 K/UL (ref 164–446)
PMV BLD AUTO: 9 FL (ref 9–12.9)
POTASSIUM SERPL-SCNC: 4.5 MMOL/L (ref 3.6–5.5)
PROT SERPL-MCNC: 6.3 G/DL (ref 6–8.2)
PROTHROMBIN TIME: 14.1 SEC (ref 12–14.6)
RBC # BLD AUTO: 4.09 M/UL (ref 4.2–5.4)
SODIUM SERPL-SCNC: 135 MMOL/L (ref 135–145)
WBC # BLD AUTO: 7.7 K/UL (ref 4.8–10.8)

## 2024-08-10 PROCEDURE — 0FT44ZZ RESECTION OF GALLBLADDER, PERCUTANEOUS ENDOSCOPIC APPROACH: ICD-10-PCS | Performed by: SURGERY

## 2024-08-10 PROCEDURE — 700111 HCHG RX REV CODE 636 W/ 250 OVERRIDE (IP): Performed by: STUDENT IN AN ORGANIZED HEALTH CARE EDUCATION/TRAINING PROGRAM

## 2024-08-10 PROCEDURE — 700101 HCHG RX REV CODE 250: Performed by: ANESTHESIOLOGY

## 2024-08-10 PROCEDURE — 700101 HCHG RX REV CODE 250: Performed by: GENERAL PRACTICE

## 2024-08-10 PROCEDURE — 700111 HCHG RX REV CODE 636 W/ 250 OVERRIDE (IP): Mod: JZ | Performed by: ANESTHESIOLOGY

## 2024-08-10 PROCEDURE — 88304 TISSUE EXAM BY PATHOLOGIST: CPT

## 2024-08-10 PROCEDURE — 700102 HCHG RX REV CODE 250 W/ 637 OVERRIDE(OP): Performed by: ANESTHESIOLOGY

## 2024-08-10 PROCEDURE — 700102 HCHG RX REV CODE 250 W/ 637 OVERRIDE(OP): Performed by: GENERAL PRACTICE

## 2024-08-10 PROCEDURE — 99233 SBSQ HOSP IP/OBS HIGH 50: CPT | Performed by: GENERAL PRACTICE

## 2024-08-10 PROCEDURE — 160048 HCHG OR STATISTICAL LEVEL 1-5: Performed by: SURGERY

## 2024-08-10 PROCEDURE — 700111 HCHG RX REV CODE 636 W/ 250 OVERRIDE (IP): Performed by: GENERAL PRACTICE

## 2024-08-10 PROCEDURE — 85025 COMPLETE CBC W/AUTO DIFF WBC: CPT

## 2024-08-10 PROCEDURE — 99232 SBSQ HOSP IP/OBS MODERATE 35: CPT | Performed by: NURSE PRACTITIONER

## 2024-08-10 PROCEDURE — 160029 HCHG SURGERY MINUTES - 1ST 30 MINS LEVEL 4: Performed by: SURGERY

## 2024-08-10 PROCEDURE — 160041 HCHG SURGERY MINUTES - EA ADDL 1 MIN LEVEL 4: Performed by: SURGERY

## 2024-08-10 PROCEDURE — 160009 HCHG ANES TIME/MIN: Performed by: SURGERY

## 2024-08-10 PROCEDURE — 85610 PROTHROMBIN TIME: CPT

## 2024-08-10 PROCEDURE — RXMED WILLOW AMBULATORY MEDICATION CHARGE: Performed by: GENERAL PRACTICE

## 2024-08-10 PROCEDURE — 700105 HCHG RX REV CODE 258: Performed by: GENERAL PRACTICE

## 2024-08-10 PROCEDURE — A9270 NON-COVERED ITEM OR SERVICE: HCPCS | Performed by: ANESTHESIOLOGY

## 2024-08-10 PROCEDURE — 80053 COMPREHEN METABOLIC PANEL: CPT

## 2024-08-10 PROCEDURE — 47562 LAPAROSCOPIC CHOLECYSTECTOMY: CPT | Performed by: SURGERY

## 2024-08-10 PROCEDURE — A9270 NON-COVERED ITEM OR SERVICE: HCPCS | Performed by: GENERAL PRACTICE

## 2024-08-10 PROCEDURE — 770006 HCHG ROOM/CARE - MED/SURG/GYN SEMI*

## 2024-08-10 PROCEDURE — 160002 HCHG RECOVERY MINUTES (STAT): Performed by: SURGERY

## 2024-08-10 PROCEDURE — 36415 COLL VENOUS BLD VENIPUNCTURE: CPT

## 2024-08-10 PROCEDURE — 700101 HCHG RX REV CODE 250: Performed by: SURGERY

## 2024-08-10 PROCEDURE — 160035 HCHG PACU - 1ST 60 MINS PHASE I: Performed by: SURGERY

## 2024-08-10 RX ORDER — CEFAZOLIN SODIUM 1 G/3ML
INJECTION, POWDER, FOR SOLUTION INTRAMUSCULAR; INTRAVENOUS PRN
Status: DISCONTINUED | OUTPATIENT
Start: 2024-08-10 | End: 2024-08-10 | Stop reason: SURG

## 2024-08-10 RX ORDER — EPHEDRINE SULFATE 50 MG/ML
INJECTION, SOLUTION INTRAVENOUS PRN
Status: DISCONTINUED | OUTPATIENT
Start: 2024-08-10 | End: 2024-08-10 | Stop reason: SURG

## 2024-08-10 RX ORDER — HYDROMORPHONE HYDROCHLORIDE 1 MG/ML
0.1 INJECTION, SOLUTION INTRAMUSCULAR; INTRAVENOUS; SUBCUTANEOUS
Status: DISCONTINUED | OUTPATIENT
Start: 2024-08-10 | End: 2024-08-10 | Stop reason: HOSPADM

## 2024-08-10 RX ORDER — SODIUM CHLORIDE, SODIUM LACTATE, POTASSIUM CHLORIDE, CALCIUM CHLORIDE 600; 310; 30; 20 MG/100ML; MG/100ML; MG/100ML; MG/100ML
INJECTION, SOLUTION INTRAVENOUS CONTINUOUS
Status: DISCONTINUED | OUTPATIENT
Start: 2024-08-10 | End: 2024-08-10 | Stop reason: HOSPADM

## 2024-08-10 RX ORDER — OXYCODONE HCL 5 MG/5 ML
5 SOLUTION, ORAL ORAL
Status: COMPLETED | OUTPATIENT
Start: 2024-08-10 | End: 2024-08-10

## 2024-08-10 RX ORDER — ONDANSETRON 2 MG/ML
4 INJECTION INTRAMUSCULAR; INTRAVENOUS
Status: DISCONTINUED | OUTPATIENT
Start: 2024-08-10 | End: 2024-08-10 | Stop reason: HOSPADM

## 2024-08-10 RX ORDER — BUPIVACAINE HYDROCHLORIDE AND EPINEPHRINE 5; 5 MG/ML; UG/ML
INJECTION, SOLUTION EPIDURAL; INTRACAUDAL; PERINEURAL
Status: DISCONTINUED | OUTPATIENT
Start: 2024-08-10 | End: 2024-08-10 | Stop reason: HOSPADM

## 2024-08-10 RX ORDER — OXYCODONE HCL 5 MG/5 ML
10 SOLUTION, ORAL ORAL
Status: COMPLETED | OUTPATIENT
Start: 2024-08-10 | End: 2024-08-10

## 2024-08-10 RX ORDER — ROCURONIUM BROMIDE 10 MG/ML
INJECTION, SOLUTION INTRAVENOUS PRN
Status: DISCONTINUED | OUTPATIENT
Start: 2024-08-10 | End: 2024-08-10 | Stop reason: SURG

## 2024-08-10 RX ORDER — HYDROMORPHONE HYDROCHLORIDE 1 MG/ML
0.4 INJECTION, SOLUTION INTRAMUSCULAR; INTRAVENOUS; SUBCUTANEOUS
Status: DISCONTINUED | OUTPATIENT
Start: 2024-08-10 | End: 2024-08-10 | Stop reason: HOSPADM

## 2024-08-10 RX ORDER — SODIUM CHLORIDE 9 MG/ML
INJECTION, SOLUTION INTRAVENOUS CONTINUOUS
Status: DISCONTINUED | OUTPATIENT
Start: 2024-08-10 | End: 2024-08-10

## 2024-08-10 RX ORDER — DEXAMETHASONE SODIUM PHOSPHATE 4 MG/ML
INJECTION, SOLUTION INTRA-ARTICULAR; INTRALESIONAL; INTRAMUSCULAR; INTRAVENOUS; SOFT TISSUE PRN
Status: DISCONTINUED | OUTPATIENT
Start: 2024-08-10 | End: 2024-08-10 | Stop reason: SURG

## 2024-08-10 RX ORDER — HYDROMORPHONE HYDROCHLORIDE 1 MG/ML
0.2 INJECTION, SOLUTION INTRAMUSCULAR; INTRAVENOUS; SUBCUTANEOUS
Status: DISCONTINUED | OUTPATIENT
Start: 2024-08-10 | End: 2024-08-10 | Stop reason: HOSPADM

## 2024-08-10 RX ORDER — DIPHENHYDRAMINE HYDROCHLORIDE 50 MG/ML
12.5 INJECTION INTRAMUSCULAR; INTRAVENOUS
Status: DISCONTINUED | OUTPATIENT
Start: 2024-08-10 | End: 2024-08-10 | Stop reason: HOSPADM

## 2024-08-10 RX ORDER — ONDANSETRON 2 MG/ML
INJECTION INTRAMUSCULAR; INTRAVENOUS PRN
Status: DISCONTINUED | OUTPATIENT
Start: 2024-08-10 | End: 2024-08-10 | Stop reason: SURG

## 2024-08-10 RX ORDER — OXYCODONE HYDROCHLORIDE 5 MG/1
5 TABLET ORAL EVERY 8 HOURS PRN
Qty: 15 TABLET | Refills: 0 | Status: SHIPPED | OUTPATIENT
Start: 2024-08-10 | End: 2024-08-16

## 2024-08-10 RX ADMIN — METRONIDAZOLE 500 MG: 5 INJECTION, SOLUTION INTRAVENOUS at 17:28

## 2024-08-10 RX ADMIN — SUGAMMADEX 200 MG: 100 INJECTION, SOLUTION INTRAVENOUS at 09:29

## 2024-08-10 RX ADMIN — EPHEDRINE SULFATE 10 MG: 50 INJECTION, SOLUTION INTRAVENOUS at 08:55

## 2024-08-10 RX ADMIN — ONDANSETRON 4 MG: 2 INJECTION INTRAMUSCULAR; INTRAVENOUS at 09:27

## 2024-08-10 RX ADMIN — PROPOFOL 150 MG: 10 INJECTION, EMULSION INTRAVENOUS at 08:49

## 2024-08-10 RX ADMIN — FENTANYL CITRATE 50 MCG: 50 INJECTION, SOLUTION INTRAMUSCULAR; INTRAVENOUS at 08:46

## 2024-08-10 RX ADMIN — OXYCODONE HYDROCHLORIDE 10 MG: 10 TABLET ORAL at 14:18

## 2024-08-10 RX ADMIN — SODIUM CHLORIDE: 9 INJECTION, SOLUTION INTRAVENOUS at 00:03

## 2024-08-10 RX ADMIN — FENTANYL CITRATE 100 MCG: 50 INJECTION, SOLUTION INTRAMUSCULAR; INTRAVENOUS at 09:24

## 2024-08-10 RX ADMIN — FENTANYL CITRATE 25 MCG: 50 INJECTION, SOLUTION INTRAMUSCULAR; INTRAVENOUS at 10:14

## 2024-08-10 RX ADMIN — OXYCODONE HYDROCHLORIDE 5 MG: 5 SOLUTION ORAL at 10:06

## 2024-08-10 RX ADMIN — FENTANYL CITRATE 50 MCG: 50 INJECTION, SOLUTION INTRAMUSCULAR; INTRAVENOUS at 09:02

## 2024-08-10 RX ADMIN — DEXAMETHASONE SODIUM PHOSPHATE 8 MG: 4 INJECTION INTRA-ARTICULAR; INTRALESIONAL; INTRAMUSCULAR; INTRAVENOUS; SOFT TISSUE at 08:53

## 2024-08-10 RX ADMIN — CEFAZOLIN 2 G: 1 INJECTION, POWDER, FOR SOLUTION INTRAMUSCULAR; INTRAVENOUS at 08:53

## 2024-08-10 RX ADMIN — METRONIDAZOLE 500 MG: 5 INJECTION, SOLUTION INTRAVENOUS at 04:23

## 2024-08-10 RX ADMIN — ROCURONIUM BROMIDE 50 MG: 50 INJECTION, SOLUTION INTRAVENOUS at 08:50

## 2024-08-10 RX ADMIN — FENTANYL CITRATE 25 MCG: 50 INJECTION, SOLUTION INTRAMUSCULAR; INTRAVENOUS at 10:10

## 2024-08-10 RX ADMIN — CEFTRIAXONE SODIUM 2000 MG: 10 INJECTION, POWDER, FOR SOLUTION INTRAVENOUS at 04:22

## 2024-08-10 RX ADMIN — FENTANYL CITRATE 25 MCG: 50 INJECTION, SOLUTION INTRAMUSCULAR; INTRAVENOUS at 10:12

## 2024-08-10 RX ADMIN — FENTANYL CITRATE 25 MCG: 50 INJECTION, SOLUTION INTRAMUSCULAR; INTRAVENOUS at 10:06

## 2024-08-10 ASSESSMENT — PAIN SCALES - GENERAL: PAIN_LEVEL: 0

## 2024-08-10 ASSESSMENT — ENCOUNTER SYMPTOMS
CHILLS: 0
BLOOD IN STOOL: 0
FEVER: 0
COUGH: 0
NERVOUS/ANXIOUS: 0
NAUSEA: 0
CONSTIPATION: 0
VOMITING: 0
FALLS: 0
SORE THROAT: 0
HEADACHES: 0
DIARRHEA: 0
INSOMNIA: 0
WEAKNESS: 0
SHORTNESS OF BREATH: 0
FLANK PAIN: 0
ABDOMINAL PAIN: 1
MYALGIAS: 0

## 2024-08-10 ASSESSMENT — FIBROSIS 4 INDEX: FIB4 SCORE: 1.33

## 2024-08-10 ASSESSMENT — PAIN DESCRIPTION - PAIN TYPE
TYPE: ACUTE PAIN
TYPE: ACUTE PAIN;SURGICAL PAIN
TYPE: ACUTE PAIN

## 2024-08-10 NOTE — CARE PLAN
The patient is Stable - Low risk of patient condition declining or worsening    Shift Goals  Clinical Goals: Monitor liver enzymes, IV fluids, NPO  Patient Goals: Comfort  Family Goals: updates    Progress made toward(s) clinical / shift goals: Patient reports no pain at this time. Patient place on NPO for possible procedure today. Ambulated to BR with steady gait, SBA and tolerated activity well. All needs met and able to rest. Bed locked to lowest position, bed alarm on and hourly rounding in place.     Problem: Knowledge Deficit - Standard  Goal: Patient and family/care givers will demonstrate understanding of plan of care, disease process/condition, diagnostic tests and medications  Outcome: Progressing     Problem: Pain - Standard  Goal: Alleviation of pain or a reduction in pain to the patient’s comfort goal  Outcome: Progressing     Problem: Fall Risk  Goal: Patient will remain free from falls  Outcome: Progressing       Patient is not progressing towards the following goals:

## 2024-08-10 NOTE — ANESTHESIA PREPROCEDURE EVALUATION
Case: [id number] Date/Time: 08/10/24 0818    Procedure: CHOLECYSTECTOMY, LAPAROSCOPIC (Abdomen)    Anesthesia type: General    Pre-op diagnosis: Choledocholithiasis    Location: [facility] OR 09 / SURGERY [facility]    Surgeons: Marie English M.D.            Relevant Problems   No relevant active problems       Physical Exam    Airway   Mallampati: II  TM distance: >3 FB  Neck ROM: full       Cardiovascular - normal exam  Rhythm: regular  Rate: normal  (-) murmur     Dental - normal exam  (+) upper dentures, lower dentures           Pulmonary - normal exam  Breath sounds clear to auscultation     Abdominal    Neurological - normal exam                   Anesthesia Plan    ASA 2       Plan - general       Airway plan will be ETT          Induction: intravenous    Postoperative Plan: Postoperative administration of opioids is intended.    Pertinent diagnostic labs and testing reviewed    Informed Consent:    Anesthetic plan and risks discussed with patient.    Use of blood products discussed with: patient whom consented to blood products.

## 2024-08-10 NOTE — OP REPORT
DATE OF OPERATION:   8/10/2024     PREOPERATIVE DIAGNOSIS: choledocholithiasis.    POSTOPERATIVE DIAGNOSIS: choledocholithiasis.    PROCEDURE PERFORMED: Laparoscopic cholecystectomy    SURGEON:    Marie English M.D.    ASSISTANT:    Sammi Finney DNP.     ANESTHESIOLOGIST:  Brandon Caballero M.D.    ANESTHESIA:   General endotracheal anesthesia.    ASA CLASSIFICATION:  II.    INDICATIONS: The patient is an 84 year-old elderly woman with clinical and radiographic findings of choledocholithiasis. She is taken to the operating room for a laparoscopic cholecystectomy.     The nature of the surgical procedure warranted additional skilled operative assistance from an Advanced Registered Nurse Practitioner (ARNP). The assistant was present during the entire operation. The surgical assistant performed the following: provided assistance with optimal surgical exposure of the operative field, provided high complexity, subspecialty decision making input, and operated the camera for the laparoscopic portion of the procedure.     FINDINGS: Multiple gallstones.    WOUND CLASSIFICATION:  Class II, Clean Contaminated.    SPECIMEN:    Gallbladder.    ESTIMATED BLOOD LOSS:  20 mL.    PROCEDURE: Following informed consent consent, the patient was properly identified, taken to the operating room and placed in supine position where general endotracheal anesthesia was administered. Intravenous antibiotics were administered by the anesthesiologist in correct time interval. The patient voided prior to surgery. A urinary catheter was not placed. Sequential compression devices were employed. The abdomen was prepped and draped into a sterile field. A timeout was conducted with the full attention and participation of all operating room personnel.    Marcaine 0.5% was used to infiltrate the port sites. A 5 mm periumbilical incision was made and the subcutaneous tissues spread bluntly. The fascia was entered with an optical trocar.  Carbon dioxide pneumoperitoneum was instilled. A 5 mm separator port was passed. A 5 mm 30 degree lens and camera was passed into the peritoneal cavity. An 11 mm port was placed in the epigastric midline under direct vision. Two 5 mm right subcostal ports were placed under direct vision.     The gallbladder was identified and elevated. Dissection was carried out to completely expose and delineate the hepatocystic triangle. The critical view was achieved definitively identifying the single cystic duct and single cystic artery entering the gallbladder. These structures were multiply clipped proximally, once distally and divided. The gallbladder was dissected free from the undersurface of the liver using electrocautery and placed within a laparoscopic specimen retrieval bag. The gallbladder was delivered intact from the abdominal cavity and submitted for pathology.  The gallbladder fossa was irrigated. All excess irrigant was evacuated from the abdominal cavity.  The gallbladder fossa was inspected. Hemostasis was satisfactory.    The epigastric port site fascia was approximated using a trocar site closure device with a 0 VICRYL® Plus Antibacterial suture. The abdomen was desufflated and the ports were removed.  The port site skin incisions were closed with interrupted 4-0 VICRYL® Plus Antibacterial subcuticular sutures. A DERMABOND ADVANCED® Topical Skin Adhesive dressing was applied.    The patient tolerated the procedure well, and there were no apparent complications. All sponge, needle, and instrument counts were correct on 2 separate occasions. The patient was awakened, extubated, and transferred to  to the post anesthesia care unit (PACU) in satisfactory condition.       ____________________________________     Marie English M.D.    DD: 8/10/2024  9:42 AM

## 2024-08-10 NOTE — ANESTHESIA PROCEDURE NOTES
Airway    Date/Time: 8/10/2024 8:51 AM    Performed by: Brandon Caballero M.D.  Authorized by: Brandon Caballero M.D.    Location:  OR  Urgency:  Elective  Indications for Airway Management:  Anesthesia      Spontaneous Ventilation: absent    Sedation Level:  Deep  Preoxygenated: Yes    Patient Position:  Sniffing  Final Airway Type:  Endotracheal airway  Final Endotracheal Airway:  ETT  Cuffed: Yes    Technique Used for Successful ETT Placement:  Direct laryngoscopy    Insertion Site:  Oral  Blade Type:  [place]  Laryngoscope Blade/Videolaryngoscope Blade Size:  3  ETT Size (mm):  7.0  Measured from:  Teeth  ETT to Teeth (cm):  21  Placement Verified by: auscultation and capnometry    Cormack-Lehane Classification:  Grade I - full view of glottis  Number of Attempts at Approach:  1

## 2024-08-10 NOTE — PROGRESS NOTES
Hospital Medicine Daily Progress Note    Date of Service  8/10/2024    Chief Complaint  Delfina Wahl is a 84 y.o. female admitted 8/8/2024 with abdominal pain    Hospital Course  This is an 84-year-old female with no significant past medical history who was transferred from outside facility on 8/8 with abdominal pain.    At outside facility ultrasound performed showing sludge, and stones, and possible stone within the gallbladder neck. CTAP showing gallbladder distended, subtle osteolytic lesions in the T12 and L2 vertebral bodies. T. bili of 3.1.  MRCP at our facility performed, patient noted to have  stone in the gallbladder neck.  GI consulted for ERCP, sphincterotomy, balloon sweep, all stones were removed 8/9.  General surgery consulted patient underwent laparoscopic cholecystectomy 8/10.    Interval Problem Update  Patient underwent ERCP yesterday with stone removal.    Patient underwent laparoscopic cholecystectomy today.    Will monitor urine output, bowel movement, pain control and tolerance of oral intake.    Hoping patient can discharge in the next 24 hours.    Family at bedside, updated on plan of care, all questions answered.    I have discussed this patient's plan of care and discharge plan at IDT rounds today with Case Management, Nursing, Nursing leadership, and other members of the IDT team.    Consultants/Specialty  None    Code Status  Full Code    Disposition  Patient is not medically clear to discharge home.    I have placed the appropriate orders for post-discharge needs.    Review of Systems  Review of Systems   All other systems reviewed and are negative.       Physical Exam  Temp:  [36.1 °C (97 °F)-37.1 °C (98.7 °F)] 36.1 °C (97 °F)  Pulse:  [62-82] 63  Resp:  [13-21] 13  BP: (130-183)/(59-87) 137/63  SpO2:  [92 %-100 %] 96 %    Physical Exam  Vitals and nursing note reviewed.   Constitutional:       General: She is not in acute distress.     Appearance: Normal appearance.   HENT:       Head: Normocephalic and atraumatic.      Mouth/Throat:      Mouth: Mucous membranes are moist.      Pharynx: No oropharyngeal exudate.   Eyes:      Extraocular Movements: Extraocular movements intact.      Pupils: Pupils are equal, round, and reactive to light.   Cardiovascular:      Rate and Rhythm: Normal rate and regular rhythm.      Pulses: Normal pulses.      Heart sounds: No murmur heard.     No friction rub. No gallop.   Pulmonary:      Effort: Pulmonary effort is normal. No respiratory distress.      Breath sounds: No wheezing, rhonchi or rales.   Abdominal:      General: Bowel sounds are normal. There is no distension.      Palpations: Abdomen is soft. There is no mass.      Tenderness: There is abdominal tenderness.      Comments: Surgical site intact   Musculoskeletal:         General: No swelling or tenderness. Normal range of motion.      Cervical back: Normal range of motion. No rigidity. No muscular tenderness.      Right lower leg: No edema.      Left lower leg: No edema.   Skin:     General: Skin is warm and dry.      Capillary Refill: Capillary refill takes less than 2 seconds.      Findings: No erythema or rash.   Neurological:      General: No focal deficit present.      Mental Status: She is alert and oriented to person, place, and time.      Motor: No weakness.      Gait: Gait normal.         Fluids    Intake/Output Summary (Last 24 hours) at 8/10/2024 1041  Last data filed at 8/10/2024 0600  Gross per 24 hour   Intake 1960 ml   Output --   Net 1960 ml       Laboratory  Recent Labs     08/09/24  0135 08/10/24  0133   WBC 7.7 7.7   RBC 4.22 4.09*   HEMOGLOBIN 12.1 11.5*   HEMATOCRIT 37.5 36.0*   MCV 88.9 88.0   MCH 28.7 28.1   MCHC 32.3 31.9*   RDW 44.4 42.7   PLATELETCT 260 259   MPV 8.9* 9.0     Recent Labs     08/09/24  0136 08/10/24  0133   SODIUM 140 135   POTASSIUM 4.0 4.5   CHLORIDE 109 105   CO2 19* 18*   GLUCOSE 110* 206*   BUN 9 11   CREATININE 0.71 0.71   CALCIUM 8.9 8.4*     Recent  Labs     08/10/24  0133   INR 1.08               Imaging  [id number] DUCTS   Preliminary Result      Digitized intraoperative radiograph is submitted for review. This examination is not for diagnostic purpose but for guidance during a surgical procedure. Please see the patient's chart for full procedural details.         INTERPRETING LOCATION: [address], [address]      [id number]   Final Result      1.  Cholelithiasis with stones present in the gallbladder neck.   2.  Multiple stones in the distal common bile duct without significant biliary dilation.   3.  Bilateral kidney cysts which no further evaluation is necessary.           Assessment/Plan  * Choledocholithiasis- (present on admission)  Assessment & Plan  Patient presented for right upper quadrant pain chills nausea and vomiting.  Ultrasound performed showing sludge, and stones, and possible stone within the gallbladder neck. CTAP showing gallbladder distended,   Transaminitis and T. bili at 3.1  Ceftriaxone Flagyl  MRCP confirmed stone in GB neck  GI consulted for ERCP, sphincterotomy, balloon sweep, all stones were removed 8/9.  General surgery consulted patient underwent laparoscopic cholecystectomy 8/10.  Fluids      ACP (advance care planning)  Assessment & Plan  18 minutes spent discussing goals of care with patient and children at bedside.  I explained to them that she will be admitted for MRCP for suspicion of choledocholithiasis.  We discussed CODE STATUS, patient states that she would like to be full code and to have everything done.         VTE prophylaxis: SCDs    I have performed a physical exam and reviewed and updated ROS and Plan today (8/10/2024). In review of yesterday's note (8/9/2024), there are no changes except as documented above.      Greater than 51 minutes spent prepping to see patient (e.g. reviewing EMR) obtaining and/or reviewing separately obtained history. Performing a medically appropriate examination and  evaluation. Independently interpreting results and communicating results to patient/family/caregiver. Counseling and educating the patient/family/caregiver. Ordering medications, tests, and/or procedures. Referring and communicating with other health care professionals.  Documenting clinical information in EPIC. Care coordination.

## 2024-08-10 NOTE — ANESTHESIA TIME REPORT
Anesthesia Start and Stop Event Times       Date Time Event    8/10/2024 0818 Ready for Procedure     0842 Anesthesia Start     0945 Anesthesia Stop          Responsible Staff  08/10/24      Name Role Begin End    Brandon Caballero M.D. Anesth 0842 0945          Overtime Reason:  no overtime (within assigned shift)    Comments:

## 2024-08-10 NOTE — PROGRESS NOTES
PREOPERATIVE NOTE: I have seen and examined the patient today.  Critical physical examination findings, laboratory indices, and radiographic studies reviewed.  I recommend laparoscopic versus open cholecystectomy.    The operative strategy was explained and reviewed. Alternatives discussed. Potential complications including, but not limited to, infection, bleeding, damage to adjacent structures, and anesthetic complications were discussed. Questions were elicited and answered to the patient's satisfaction.  Operative consent signed.        ____________________________________     Marie English M.D.    DD: 8/10/2024  8:32 AM

## 2024-08-10 NOTE — PROGRESS NOTES
Gastroenterology Progress Note               Author:  YAZ Zuluaga Date & Time Created: 8/10/2024 3:29 PM       Patient ID:  Name:             Delfina Wahl    YOB: 1940  Age:                 84 y.o.  female  MRN:               [id number]        Medical Decision Making, by Problem:  Active Hospital Problems    Diagnosis     Choledocholithiasis [K80.50]     ACP (advance care planning) [Z71.89]            Presenting Chief Complaint:  Choledocholithiasis     History of Present Illness:   84-year-old female with epigastric and right upper quadrant abdominal pain.  Elevated biochemical liver test.  Underwent MRCP today.  Has choledocholithiasis.  Request for ERCP.         Interval History:  8/10/2024:  Hannah [id number] utilized.  postprocedure day 1 s/p ERCP with calculus removal.  Patient underwent laparoscopic cholecystectomy today.  Seen at bedside with family. AAOx4, reports feeling well. Mild RUQ pain. Denies nausea, or vomiting.  Patient reports she had water last night post ERCP.  She states that she tolerated well without difficulty or postprandial pain.  LFTs decreasing.  Hemoglobin stable.  No leukocytosis.        Hospital Medications:  Current Facility-Administered Medications   Medication Dose Frequency Provider Last Rate Last Admin    [START ON 8/11/2024] cefTRIAXone (Rocephin) syringe 2,000 mg  2,000 mg Q24HRS KOBE QuezadaO.        labetalol (Normodyne/Trandate) injection 10 mg  10 mg Q4HRS PRN Jimmy Randle M.D.        ondansetron (Zofran) syringe/vial injection 4 mg  4 mg Q4HRS PRN Jimmy Randle M.D.        ondansetron (Zofran ODT) dispertab 4 mg  4 mg Q4HRS PRN Jimmy Randle M.D.        metroNIDAZOLE (Flagyl) IVPB 500 mg  500 mg Q12HRS Jimmy Randle M.D.   Stopped at 08/10/24 0523    Pharmacy Consult Request ...Pain Management Review 1 Each  1 Each PHARMACY TO DOSE ALFREDO Quezada.O.        oxyCODONE immediate-release (Roxicodone)  tablet 5 mg  5 mg Q3HRS PRN Kalina Mcgrath D.O.        Or    oxyCODONE immediate release (Roxicodone) tablet 10 mg  10 mg Q3HRS PRN Kalina Mcgrath, D.O.   10 mg at 08/10/24 1418    Or    HYDROmorphone (Dilaudid) injection 0.5 mg  0.5 mg Q3HRS PRN Kalina Mcgrath D.O.       Last reviewed on 8/10/2024  7:33 AM by Tiffany Mckeon R.N.       Review of Systems:  Review of Systems   Constitutional:  Negative for chills, fever and malaise/fatigue.   HENT:  Negative for congestion and sore throat.    Respiratory:  Negative for cough and shortness of breath.    Cardiovascular:  Negative for chest pain and leg swelling.   Gastrointestinal:  Positive for abdominal pain (mild ruq). Negative for blood in stool, constipation, diarrhea, melena, nausea and vomiting.   Genitourinary:  Negative for dysuria and flank pain.   Musculoskeletal:  Negative for falls and myalgias.   Neurological:  Negative for weakness and headaches.   Psychiatric/Behavioral:  The patient is not nervous/anxious and does not have insomnia.    All other systems reviewed and are negative.        Vital signs:  Weight/BMI: There is no height or weight on file to calculate BMI.  /63   Pulse 80   Temp 36.6 °C (97.9 °F) (Temporal)   Resp 17   Wt 79 kg (174 lb 2.6 oz)   SpO2 96%   Vitals:    08/10/24 1200 08/10/24 1230 08/10/24 1330 08/10/24 1431   BP: 129/64 122/60 117/64 115/63   Pulse: 62 64 77 80   Resp: 18 17 17 17   Temp: 36.3 °C (97.4 °F) 36.4 °C (97.6 °F) 36.7 °C (98 °F) 36.6 °C (97.9 °F)   TempSrc: Temporal Temporal Temporal Temporal   SpO2: 95% 95% 96% 96%   Weight:         Oxygen Therapy:  Pulse Oximetry: 96 %, O2 (LPM): 2, O2 Delivery Device: Silicone Nasal Cannula    Intake/Output Summary (Last 24 hours) at 8/10/2024 1529  Last data filed at 8/10/2024 0600  Gross per 24 hour   Intake 1660 ml   Output --   Net 1660 ml         Physical Exam:  Physical Exam  Vitals and nursing note reviewed.   Constitutional:       General: She is awake. She  is not in acute distress.     Appearance: Normal appearance. She is well-developed, well-groomed and overweight. She is not ill-appearing or toxic-appearing.   HENT:      Head: Normocephalic and atraumatic.      Nose: Nose normal.      Mouth/Throat:      Mouth: Mucous membranes are moist.      Pharynx: Oropharynx is clear. No oropharyngeal exudate.   Eyes:      General: No scleral icterus.     Extraocular Movements: Extraocular movements intact.      Conjunctiva/sclera: Conjunctivae normal.   Cardiovascular:      Rate and Rhythm: Normal rate and regular rhythm.      Pulses: Normal pulses.      Heart sounds: Normal heart sounds.   Pulmonary:      Effort: Pulmonary effort is normal. No respiratory distress.      Breath sounds: Normal breath sounds.   Abdominal:      General: Abdomen is flat. Bowel sounds are normal. There is no distension.      Palpations: Abdomen is soft.      Tenderness: There is abdominal tenderness (mild with palpation). There is no guarding.      Comments: Laparoscopic incisions well-approximated.  No surrounding erythema, edema, drainage.   Musculoskeletal:      Right lower leg: No edema.      Left lower leg: No edema.   Skin:     General: Skin is warm and dry.      Capillary Refill: Capillary refill takes less than 2 seconds.      Coloration: Skin is not jaundiced.   Neurological:      General: No focal deficit present.      Mental Status: She is alert and oriented to person, place, and time. Mental status is at baseline.   Psychiatric:         Mood and Affect: Mood normal.         Behavior: Behavior normal. Behavior is cooperative.             Labs:  Recent Labs     08/09/24  0136 08/10/24  0133   SODIUM 140 135   POTASSIUM 4.0 4.5   CHLORIDE 109 105   CO2 19* 18*   BUN 9 11   CREATININE 0.71 0.71   CALCIUM 8.9 8.4*     Recent Labs     08/09/24  0136 08/09/24  1028 08/10/24  0133   ALTSGPT 203*  --  133*   ASTSGOT 109*  --  48*   ALKPHOSPHAT 521*  --  398*   TBILIRUBIN 1.6*  --  0.6   LIPASE   --  34  --    GLUCOSE 110*  --  206*     Recent Labs     08/09/24  0135 08/09/24  0136 08/10/24  0133   WBC 7.7  --  7.7   NEUTSPOLYS 71.30  --  84.00*   LYMPHOCYTES 17.60*  --  9.30*   MONOCYTES 7.60  --  3.00   EOSINOPHILS 1.80  --  0.00   BASOPHILS 0.70  --  0.30   ASTSGOT  --  109* 48*   ALTSGPT  --  203* 133*   ALKPHOSPHAT  --  521* 398*   TBILIRUBIN  --  1.6* 0.6     Recent Labs     08/09/24  0135 08/10/24  0133   RBC 4.22 4.09*   HEMOGLOBIN 12.1 11.5*   HEMATOCRIT 37.5 36.0*   PLATELETCT 260 259   PROTHROMBTM  --  14.1   INR  --  1.08     Recent Results (from the past 24 hour(s))   Comp Metabolic Panel (CMP)    Collection Time: 08/10/24  1:33 AM   Result Value Ref Range    Sodium 135 135 - 145 mmol/L    Potassium 4.5 3.6 - 5.5 mmol/L    Chloride 105 96 - 112 mmol/L    Co2 18 (L) 20 - 33 mmol/L    Anion Gap 12.0 7.0 - 16.0    Glucose 206 (H) 65 - 99 mg/dL    Bun 11 8 - 22 mg/dL    Creatinine 0.71 0.50 - 1.40 mg/dL    Calcium 8.4 (L) 8.5 - 10.5 mg/dL    Correct Calcium 9.1 8.5 - 10.5 mg/dL    AST(SGOT) 48 (H) 12 - 45 U/L    ALT(SGPT) 133 (H) 2 - 50 U/L    Alkaline Phosphatase 398 (H) 30 - 99 U/L    Total Bilirubin 0.6 0.1 - 1.5 mg/dL    Albumin 3.1 (L) 3.2 - 4.9 g/dL    Total Protein 6.3 6.0 - 8.2 g/dL    Globulin 3.2 1.9 - 3.5 g/dL    A-G Ratio 1.0 g/dL   CBC with Differential    Collection Time: 08/10/24  1:33 AM   Result Value Ref Range    WBC 7.7 4.8 - 10.8 K/uL    RBC 4.09 (L) 4.20 - 5.40 M/uL    Hemoglobin 11.5 (L) 12.0 - 16.0 g/dL    Hematocrit 36.0 (L) 37.0 - 47.0 %    MCV 88.0 81.4 - 97.8 fL    MCH 28.1 27.0 - 33.0 pg    MCHC 31.9 (L) 32.2 - 35.5 g/dL    RDW 42.7 35.9 - 50.0 fL    Platelet Count 259 164 - 446 K/uL    MPV 9.0 9.0 - 12.9 fL    Neutrophils-Polys 84.00 (H) 44.00 - 72.00 %    Lymphocytes 9.30 (L) 22.00 - 41.00 %    Monocytes 3.00 0.00 - 13.40 %    Eosinophils 0.00 0.00 - 6.90 %    Basophils 0.30 0.00 - 1.80 %    Immature Granulocytes 3.40 (H) 0.00 - 0.90 %    Nucleated RBC 0.00 0.00 - 0.20  /100 WBC    Neutrophils (Absolute) 6.50 1.82 - 7.42 K/uL    Lymphs (Absolute) 0.72 (L) 1.00 - 4.80 K/uL    Monos (Absolute) 0.23 0.00 - 0.85 K/uL    Eos (Absolute) 0.00 0.00 - 0.51 K/uL    Baso (Absolute) 0.02 0.00 - 0.12 K/uL    Immature Granulocytes (abs) 0.26 (H) 0.00 - 0.11 K/uL    NRBC (Absolute) 0.00 K/uL   Prothrombin Time    Collection Time: 08/10/24  1:33 AM   Result Value Ref Range    PT 14.1 12.0 - 14.6 sec    INR 1.08 0.87 - 1.13   ESTIMATED GFR    Collection Time: 08/10/24  1:33 AM   Result Value Ref Range    GFR (CKD-EPI) 84 >60 mL/min/1.73 m 2       Radiology Review:  [id number] DUCTS   Preliminary Result      Digitized intraoperative radiograph is submitted for review. This examination is not for diagnostic purpose but for guidance during a surgical procedure. Please see the patient's chart for full procedural details.         INTERPRETING LOCATION: [address] [place] [address], [facility]      [id number]   Final Result      1.  Cholelithiasis with stones present in the gallbladder neck.   2.  Multiple stones in the distal common bile duct without significant biliary dilation.   3.  Bilateral kidney cysts which no further evaluation is necessary.            MDM (Data Review):   -Records reviewed and summarized in current documentation  -I personally reviewed and interpreted the laboratory results  -I personally reviewed the radiology images    Assessment/Recommendations:  Choledocholithiasis  S/p ERCP with biliary sphincterotomy, calculus removal  Transaminitis  Acute cholecystitis  S/p laparoscopic cholecystectomy      MDM:  Is an 84-year-old female with a past medical history as listed above who presented to [facility] on 8/8/2024 with abdominal pain.  She was found to have choledocholithiasis and underwent ERCP with biliary sphincterotomy and calculus removal on 8/9/2024.  No stent was placed.  She had a follow-up laparoscopic cholecystectomy on 8/10/2024.  Seen at bedside  postoperatively.  She reports mild abdominal pain bilomas denies nausea, vomiting.  She is tolerating oral intake without difficulty or postprandial pain.  LFTs decreasing.  Total bilirubin normal.  Hemoglobin stable.  No leukocytosis.  Low suspicion for posterior CP bleeding, infection, pancreatitis.  There was no stent placed during ERCP, therefore patient does not need outpatient GI follow-up.    Plan:  Diet per primary team  Avoid NSAIDs x 5 days due to risk of bleeding from sphincterotomy  Follow-up with surgical team per their recommendations    No further interventions from acute GI service. GI to sign off. Please reconsult for any further questions or concerns.    Discussed with patient, family, Dr. Christensen.       Core Quality Measures   Reviewed items::  Labs, Medications and Radiology reports reviewed

## 2024-08-10 NOTE — CARE PLAN
Pt AO x 4.  Pt primarily Guatemalan speaking,  services used during care.  Pt denied pain during initial assessment, expressed wanting the procedure to go well and be over with.  Pt has multiple family members at bedside for support.  Call light and belongings within reach.  Bed locked and in lowest position.  Hourly rounding.  Needs currently met.           The patient is Stable - Low risk of patient condition declining or worsening    Shift Goals  Clinical Goals: successful procedure; post op vitals per protocol  Patient Goals: feel better  Family Goals: updates    Progress made toward(s) clinical / shift goals:    Pt had successful lap/bud and returned to unit without complications, vital signs remained stable during entirety of shift.       Problem: Knowledge Deficit - Standard  Goal: Patient and family/care givers will demonstrate understanding of plan of care, disease process/condition, diagnostic tests and medications  Outcome: Progressing     Problem: Fall Risk  Goal: Patient will remain free from falls  Outcome: Progressing     Problem: Pain - Standard  Goal: Alleviation of pain or a reduction in pain to the patient’s comfort goal  Outcome: Progressing

## 2024-08-10 NOTE — ANESTHESIA POSTPROCEDURE EVALUATION
Patient: Delfina Wahl    Procedure Summary       Date: 08/10/24 Room / Location: [facility] 09 / SURGERY [facility]    Anesthesia Start: 0842 Anesthesia Stop: 0945    Procedure: CHOLECYSTECTOMY, LAPAROSCOPIC (Abdomen) Diagnosis: (Choledocholithiasis)    Surgeons: Marie English M.D. Responsible Provider: Brandon Caballero M.D.    Anesthesia Type: general ASA Status: 2            Final Anesthesia Type: general  Last vitals  BP   Blood Pressure : (!) 168/83    Temp   36.3 °C (97.3 °F)    Pulse   62   Resp   16    SpO2   96 %      Anesthesia Post Evaluation    Patient location during evaluation: PACU  Patient participation: complete - patient participated  Level of consciousness: awake and alert  Pain score: 0    Airway patency: patent  Anesthetic complications: no  Cardiovascular status: hemodynamically stable  Respiratory status: acceptable  Hydration status: euvolemic    PONV: none          No notable events documented.     Nurse Pain Score: 0 (NPRS)

## 2024-08-11 ENCOUNTER — PHARMACY VISIT (OUTPATIENT)
Dept: PHARMACY | Facility: MEDICAL CENTER | Age: 84
End: 2024-08-11
Payer: COMMERCIAL

## 2024-08-11 VITALS
TEMPERATURE: 98.2 F | HEART RATE: 67 BPM | RESPIRATION RATE: 17 BRPM | OXYGEN SATURATION: 93 % | DIASTOLIC BLOOD PRESSURE: 76 MMHG | SYSTOLIC BLOOD PRESSURE: 149 MMHG | WEIGHT: 169.09 LBS

## 2024-08-11 LAB
ALBUMIN SERPL BCP-MCNC: 3.1 G/DL (ref 3.2–4.9)
ALBUMIN/GLOB SERPL: 0.9 G/DL
ALP SERPL-CCNC: 333 U/L (ref 30–99)
ALT SERPL-CCNC: 93 U/L (ref 2–50)
ANION GAP SERPL CALC-SCNC: 12 MMOL/L (ref 7–16)
AST SERPL-CCNC: 31 U/L (ref 12–45)
BASOPHILS # BLD AUTO: 0.4 % (ref 0–1.8)
BASOPHILS # BLD: 0.07 K/UL (ref 0–0.12)
BILIRUB SERPL-MCNC: 0.6 MG/DL (ref 0.1–1.5)
BUN SERPL-MCNC: 9 MG/DL (ref 8–22)
CALCIUM ALBUM COR SERPL-MCNC: 9.3 MG/DL (ref 8.5–10.5)
CALCIUM SERPL-MCNC: 8.6 MG/DL (ref 8.5–10.5)
CHLORIDE SERPL-SCNC: 105 MMOL/L (ref 96–112)
CO2 SERPL-SCNC: 19 MMOL/L (ref 20–33)
CREAT SERPL-MCNC: 0.62 MG/DL (ref 0.5–1.4)
EOSINOPHIL # BLD AUTO: 0 K/UL (ref 0–0.51)
EOSINOPHIL NFR BLD: 0 % (ref 0–6.9)
ERYTHROCYTE [DISTWIDTH] IN BLOOD BY AUTOMATED COUNT: 45 FL (ref 35.9–50)
GFR SERPLBLD CREATININE-BSD FMLA CKD-EPI: 88 ML/MIN/1.73 M 2
GLOBULIN SER CALC-MCNC: 3.5 G/DL (ref 1.9–3.5)
GLUCOSE SERPL-MCNC: 122 MG/DL (ref 65–99)
HCT VFR BLD AUTO: 37.9 % (ref 37–47)
HGB BLD-MCNC: 12.1 G/DL (ref 12–16)
IMM GRANULOCYTES # BLD AUTO: 0.13 K/UL (ref 0–0.11)
IMM GRANULOCYTES NFR BLD AUTO: 0.7 % (ref 0–0.9)
LYMPHOCYTES # BLD AUTO: 1.76 K/UL (ref 1–4.8)
LYMPHOCYTES NFR BLD: 9.6 % (ref 22–41)
MAGNESIUM SERPL-MCNC: 2.2 MG/DL (ref 1.5–2.5)
MCH RBC QN AUTO: 28.7 PG (ref 27–33)
MCHC RBC AUTO-ENTMCNC: 31.9 G/DL (ref 32.2–35.5)
MCV RBC AUTO: 90 FL (ref 81.4–97.8)
MONOCYTES # BLD AUTO: 0.91 K/UL (ref 0–0.85)
MONOCYTES NFR BLD AUTO: 5 % (ref 0–13.4)
NEUTROPHILS # BLD AUTO: 15.45 K/UL (ref 1.82–7.42)
NEUTROPHILS NFR BLD: 84.3 % (ref 44–72)
NRBC # BLD AUTO: 0 K/UL
NRBC BLD-RTO: 0 /100 WBC (ref 0–0.2)
PHOSPHATE SERPL-MCNC: 2.8 MG/DL (ref 2.5–4.5)
PLATELET # BLD AUTO: 287 K/UL (ref 164–446)
PMV BLD AUTO: 9.2 FL (ref 9–12.9)
POTASSIUM SERPL-SCNC: 4.6 MMOL/L (ref 3.6–5.5)
PROT SERPL-MCNC: 6.6 G/DL (ref 6–8.2)
RBC # BLD AUTO: 4.21 M/UL (ref 4.2–5.4)
SODIUM SERPL-SCNC: 136 MMOL/L (ref 135–145)
WBC # BLD AUTO: 18.3 K/UL (ref 4.8–10.8)

## 2024-08-11 PROCEDURE — 85025 COMPLETE CBC W/AUTO DIFF WBC: CPT

## 2024-08-11 PROCEDURE — 700102 HCHG RX REV CODE 250 W/ 637 OVERRIDE(OP): Performed by: GENERAL PRACTICE

## 2024-08-11 PROCEDURE — 84100 ASSAY OF PHOSPHORUS: CPT

## 2024-08-11 PROCEDURE — 99024 POSTOP FOLLOW-UP VISIT: CPT

## 2024-08-11 PROCEDURE — 700111 HCHG RX REV CODE 636 W/ 250 OVERRIDE (IP): Performed by: GENERAL PRACTICE

## 2024-08-11 PROCEDURE — 700101 HCHG RX REV CODE 250: Performed by: GENERAL PRACTICE

## 2024-08-11 PROCEDURE — 36415 COLL VENOUS BLD VENIPUNCTURE: CPT

## 2024-08-11 PROCEDURE — 99239 HOSP IP/OBS DSCHRG MGMT >30: CPT | Performed by: GENERAL PRACTICE

## 2024-08-11 PROCEDURE — 80053 COMPREHEN METABOLIC PANEL: CPT

## 2024-08-11 PROCEDURE — 700111 HCHG RX REV CODE 636 W/ 250 OVERRIDE (IP): Performed by: STUDENT IN AN ORGANIZED HEALTH CARE EDUCATION/TRAINING PROGRAM

## 2024-08-11 PROCEDURE — 83735 ASSAY OF MAGNESIUM: CPT

## 2024-08-11 PROCEDURE — A9270 NON-COVERED ITEM OR SERVICE: HCPCS | Performed by: GENERAL PRACTICE

## 2024-08-11 RX ORDER — AMOXICILLIN 250 MG
2 CAPSULE ORAL 2 TIMES DAILY
Status: DISCONTINUED | OUTPATIENT
Start: 2024-08-11 | End: 2024-08-11

## 2024-08-11 RX ORDER — POLYETHYLENE GLYCOL 3350 17 G/17G
1 POWDER, FOR SOLUTION ORAL
Status: DISCONTINUED | OUTPATIENT
Start: 2024-08-11 | End: 2024-08-11 | Stop reason: HOSPADM

## 2024-08-11 RX ORDER — POLYETHYLENE GLYCOL 3350 17 G/17G
1 POWDER, FOR SOLUTION ORAL
Status: DISCONTINUED | OUTPATIENT
Start: 2024-08-11 | End: 2024-08-11

## 2024-08-11 RX ORDER — LACTULOSE 20 G/30ML
45 SOLUTION ORAL EVERY 4 HOURS
Status: COMPLETED | OUTPATIENT
Start: 2024-08-11 | End: 2024-08-11

## 2024-08-11 RX ADMIN — OXYCODONE HYDROCHLORIDE 5 MG: 5 TABLET ORAL at 07:29

## 2024-08-11 RX ADMIN — CEFTRIAXONE SODIUM 2000 MG: 10 INJECTION, POWDER, FOR SOLUTION INTRAVENOUS at 05:00

## 2024-08-11 RX ADMIN — METRONIDAZOLE 500 MG: 5 INJECTION, SOLUTION INTRAVENOUS at 04:20

## 2024-08-11 RX ADMIN — LACTULOSE 45 ML: 10 SOLUTION ORAL at 07:29

## 2024-08-11 RX ADMIN — LACTULOSE 45 ML: 10 SOLUTION ORAL at 10:14

## 2024-08-11 RX ADMIN — SENNOSIDES AND DOCUSATE SODIUM 2 TABLET: 50; 8.6 TABLET ORAL at 07:29

## 2024-08-11 ASSESSMENT — PAIN DESCRIPTION - PAIN TYPE: TYPE: ACUTE PAIN

## 2024-08-11 NOTE — CARE PLAN
Pt AO x 4.  Pt primarily Uruguayan speaking,  services used during care.  Pt c/o pain at lap/bud sites, medication intervention given per MAR.  Call light and belongings within reach.  Bed locked and in lowest position.  Hourly rounding.  Needs currently met.           The patient is Stable - Low risk of patient condition declining or worsening    Shift Goals  Clinical Goals: pain manage; wean o2; discharge  Patient Goals: go home today  Family Goals: LUCINDA    Progress made toward(s) clinical / shift goals:    Pt was able to tolerate O2 at RA.  Pt remained at or below 5/10.  Pt discharged home successfully prior to end of shift.       Problem: Knowledge Deficit - Standard  Goal: Patient and family/care givers will demonstrate understanding of plan of care, disease process/condition, diagnostic tests and medications  Outcome: Progressing     Problem: Pain - Standard  Goal: Alleviation of pain or a reduction in pain to the patient’s comfort goal  Outcome: Progressing     Problem: Fall Risk  Goal: Patient will remain free from falls  Outcome: Progressing

## 2024-08-11 NOTE — DISCHARGE SUMMARY
Discharge Summary    CHIEF COMPLAINT ON ADMISSION  No chief complaint on file.      Reason for Admission  cholecystitis, gallstones     Admission Date  8/8/2024    CODE STATUS  Full Code    HPI & HOSPITAL COURSE  This is an 84-year-old female with no significant past medical history who was transferred from outside facility on 8/8 with abdominal pain.    At outside facility ultrasound performed showing sludge, and stones, and possible stone within the gallbladder neck. CTAP showing gallbladder distended, subtle osteolytic lesions in the T12 and L2 vertebral bodies. T. bili of 3.1.  MRCP at our facility performed, patient noted to have  stone in the gallbladder neck.  GI consulted for ERCP, sphincterotomy, balloon sweep, all stones were removed 8/9.  General surgery consulted patient underwent laparoscopic cholecystectomy 8/10.    Patient is tolerating oral intake, good urine output, had a bowel movement on day of surgery.  She is to follow-up with surgical team in about 1 to 2 weeks for postoperative wound check.    Therefore, she is discharged in good and stable condition to home with close outpatient follow-up.    The patient met 2-midnight criteria for an inpatient stay at the time of discharge.    Discharge Date  8/11/2024    FOLLOW UP ITEMS POST DISCHARGE  PCP  Surgery    DISCHARGE DIAGNOSES  Principal Problem:    Choledocholithiasis (POA: Yes)  Active Problems:    ACP (advance care planning) (POA: Unknown)  Resolved Problems:    * No resolved hospital problems. *      FOLLOW UP  Marie English M.D.  [address] [place] [id number]  [phone]    Follow up        MEDICATIONS ON DISCHARGE     Medication List        START taking these medications        Instructions   oxyCODONE immediate-release 5 MG Tabs  Commonly known as: Roxicodone   Take 1 Tablet by mouth every 8 hours as needed for Severe Pain for up to 5 days.  Dose: 5 mg              Allergies  No Known Allergies    DIET  Orders Placed  This Encounter   Procedures    Diet Order Diet: Clear Liquid     Standing Status:   Standing     Number of Occurrences:   1     Order Specific Question:   Diet:     Answer:   Clear Liquid [10]       ACTIVITY  As tolerated.  Weight bearing as tolerated    CONSULTATIONS  GI  General Surgery    PROCEDURES  ERCP  Laparoscopic cholecystectomy    LABORATORY  Lab Results   Component Value Date    SODIUM 136 08/11/2024    POTASSIUM 4.6 08/11/2024    CHLORIDE 105 08/11/2024    CO2 19 (L) 08/11/2024    GLUCOSE 122 (H) 08/11/2024    BUN 9 08/11/2024    CREATININE 0.62 08/11/2024        Lab Results   Component Value Date    WBC 18.3 (H) 08/11/2024    HEMOGLOBIN 12.1 08/11/2024    HEMATOCRIT 37.9 08/11/2024    PLATELETCT 287 08/11/2024      [id number] DUCTS   Preliminary Result      Digitized intraoperative radiograph is submitted for review. This examination is not for diagnostic purpose but for guidance during a surgical procedure. Please see the patient's chart for full procedural details.         INTERPRETING LOCATION: [address] [place] [address], [id number]      [id number]   Final Result      1.  Cholelithiasis with stones present in the gallbladder neck.   2.  Multiple stones in the distal common bile duct without significant biliary dilation.   3.  Bilateral kidney cysts which no further evaluation is necessary.           Total time of the discharge process exceeds 45 minutes.

## 2024-08-11 NOTE — CARE PLAN
The patient is Stable - Low risk of patient condition declining or worsening    Shift Goals  Clinical Goals: Pain management  Patient Goals: sleep  Family Goals: update    Progress made toward(s) clinical / shift goals:  Patient A&Ox4, reports minimal pain on the abdomen. Patient declines pain medication at this time. Able to rest and all needs attended. Bed locked to lowest position, hourly rounding in place and call light in reach.     Problem: Knowledge Deficit - Standard  Goal: Patient and family/care givers will demonstrate understanding of plan of care, disease process/condition, diagnostic tests and medications  Outcome: Progressing     Problem: Pain - Standard  Goal: Alleviation of pain or a reduction in pain to the patient’s comfort goal  Outcome: Progressing     Problem: Fall Risk  Goal: Patient will remain free from falls  Outcome: Progressing       Patient is not progressing towards the following goals:

## 2024-08-11 NOTE — PROGRESS NOTES
DATE: 8/11/2024    Post Operative Day 1 laparoscopic cholecystectomy.    INTERVAL EVENTS:  Reports appropriate surgical tenderness.  Denies flatus.   Getting up for the restroom.  Labs stable.    PHYSICAL EXAMINATION:  Vital Signs: BP (!) 149/76   Pulse 67   Temp 36.8 °C (98.2 °F) (Temporal)   Resp 17   Wt 76.7 kg (169 lb 1.5 oz)   SpO2 93%     Awake and alert  Abdomen soft, with appropriate surgical tenderness.   Port sites well approximate with derm.    LABORATORY VALUES:  Recent Labs     08/09/24  0135 08/10/24  0133 08/11/24  0440   WBC 7.7 7.7 18.3*   RBC 4.22 4.09* 4.21   HEMOGLOBIN 12.1 11.5* 12.1   HEMATOCRIT 37.5 36.0* 37.9   MCV 88.9 88.0 90.0   MCH 28.7 28.1 28.7   MCHC 32.3 31.9* 31.9*   RDW 44.4 42.7 45.0   PLATELETCT 260 259 287   MPV 8.9* 9.0 9.2     Recent Labs     08/09/24  0136 08/10/24  0133 08/11/24  0440   SODIUM 140 135 136   POTASSIUM 4.0 4.5 4.6   CHLORIDE 109 105 105   CO2 19* 18* 19*   GLUCOSE 110* 206* 122*   BUN 9 11 9   CREATININE 0.71 0.71 0.62   CALCIUM 8.9 8.4* 8.6     Recent Labs     08/09/24  0136 08/10/24  0133 08/11/24  0440   ASTSGOT 109* 48* 31   ALTSGPT 203* 133* 93*   TBILIRUBIN 1.6* 0.6 0.6   ALKPHOSPHAT 521* 398* 333*   GLOBULIN 3.3 3.2 3.5   INR  --  1.08  --      Recent Labs     08/10/24  0133   INR 1.08        IMAGING:  [id number] DUCTS   Preliminary Result      Digitized intraoperative radiograph is submitted for review. This examination is not for diagnostic purpose but for guidance during a surgical procedure. Please see the patient's chart for full procedural details.         INTERPRETING LOCATION: [address] [place] [address], [id number]      [id number]   Final Result      1.  Cholelithiasis with stones present in the gallbladder neck.   2.  Multiple stones in the distal common bile duct without significant biliary dilation.   3.  Bilateral kidney cysts which no further evaluation is necessary.          ASSESSMENT AND PLAN:  * Choledocholithiasis- (present on  admission)  Assessment & Plan   Patient had an ERCP completed today.  N.p.o. at midnight for laparoscopic cholecystectomy tomorrow.  Plan discussed with family at bedside.  All questions answered.  8/10 laparoscopic cholecystectomy      - Progressing well post surgery.  Cleared for discharge from a surgical standpoint.    Surgery signing off. Please call for any questions.  Appreciate Medicine's primary management of patient.         ____________________________________     Sammi Finney D.N.P.    DD: 8/11/2024  10:08 AM

## 2024-08-11 NOTE — DISCHARGE PLANNING
@0805, noted discharge order. Patient on 2l/nc per medical record. Voalte message to bedside RN to ask about home oxygen in place and if not, is attempt at weaning oxygen being done.     @0812 - weaning in process per bedside RN. Baseline for patient is room air.

## 2024-08-11 NOTE — DISCHARGE INSTRUCTIONS
LAPAROSCOPIC CHOLECYSTECTOMY DISCHARGE INSTRUCTIONS:    DIET: Upon discharge from the hospital you may resume your normal preoperative diet. Depending on how you are feeling and whether you have nausea or not, you may wish to stay with a bland diet for the first few days. However, you can advance this as quickly as you feel ready.    ACTIVITIES: After discharge from the hospital, you may resume full routine activities. Heavy lifting and strenuous activities may make your incisions sore, but are not likely to result in injury. Routine activities of daily living are acceptable.    DRIVING: You may drive whenever you are no longer taking narcotic pain medications and are able to perform the activities needed to drive safely, i.e. turning, bending, twisting, etc.    BATHING: You may get the wound wet at any time after leaving the hospital. You may shower, but do not submerge in a bath for at least 48 hours.     BOWEL FUNCTION: A few patients, after this operation, will develop either frequent or loose stools after meals. This usually corrects itself after a few days, to a few weeks. If this occurs, do not worry; it is not unusual and will likely resolve. Much more common than loose stools, is constipation. The combination of pain medication and decreased activity level can cause constipation in otherwise normal patients. If you feel this is occurring, take a laxative (Milk of Magnesia, Ex-Lax, Senokot, etc.) until the problem has resolved.    PAIN MEDICATION: You will have some degree of postoperative discomfort.  Recommend over-the-counter Tylenol and ibuprofen taken round-the-clock (whether you feel like you need them or not) for the first 3 to 4 days following surgery. You may and should take these medications together. You may be given a prescription for pain medication at discharge. Please take these as directed. It is important to remember not to take medications on an empty stomach as this may cause nausea.  Avoid alcohol consumption while taking pain medication.    Call if you have: (1) Fevers to more than 1010 F, (2) Unusual chest or leg pain, (3) Drainage or fluid from incision that may be foul smelling, increased tenderness or soreness at the wound or the wound edges are no longer together, redness or swelling at the incision site. (4) New or recurrent right upper quadrant pain.    If you have any additional questions, please do not hesitate to call the office and speak to either myself or the physician on call.    [facility] [address]  [address] [place] [address] [place] [address]  [phone]

## 2024-08-12 ENCOUNTER — TELEPHONE (OUTPATIENT)
Dept: HEALTH INFORMATION MANAGEMENT | Facility: OTHER | Age: 84
End: 2024-08-12

## 2024-08-14 LAB
BACTERIA BLD CULT: NORMAL
BACTERIA BLD CULT: NORMAL
PATHOLOGY CONSULT NOTE: NORMAL
SIGNIFICANT IND 70042: NORMAL
SIGNIFICANT IND 70042: NORMAL
SITE SITE: NORMAL
SITE SITE: NORMAL
SOURCE SOURCE: NORMAL
SOURCE SOURCE: NORMAL

## 2024-08-22 NOTE — PROGRESS NOTES
HISTORY OF PRESENT ILLNESS: The patient is a 84 year-old woman who returns to the [facility] for routine follow-up after undergoing a laparoscopic cholecystectomy for choledocholithiasis by Marie English MD, on 8/10/2024. Since surgery, the patient is doing well, she is tolerating a regular diet, and she has no pain.    PHYSICAL EXAMINATION:  Vital Signs: There were no vitals taken for this visit.  Physical Exam  Vitals and nursing note reviewed.   HENT:      Head: Normocephalic.      Nose: Nose normal. No congestion.      Mouth/Throat:      Mouth: Mucous membranes are moist.      Pharynx: Oropharynx is clear.   Eyes:      Pupils: Pupils are equal, round, and reactive to light.   Cardiovascular:      Rate and Rhythm: Normal rate.      Pulses: Normal pulses.   Pulmonary:      Effort: Pulmonary effort is normal.   Abdominal:      General: Abdomen is flat.      Palpations: Abdomen is soft.      Comments: 4 lap sites well approximated with skin glue.   Musculoskeletal:         General: Normal range of motion.      Cervical back: Normal range of motion.   Skin:     General: Skin is warm and dry.   Neurological:      General: No focal deficit present.      Mental Status: She is alert and oriented to person, place, and time.         LABORATORY VALUES:  Lab Results   Component Value Date/Time    WBC 18.3 (H) 08/11/2024 04:40 AM    RBC 4.21 08/11/2024 04:40 AM    HEMOGLOBIN 12.1 08/11/2024 04:40 AM    HEMATOCRIT 37.9 08/11/2024 04:40 AM    MCV 90.0 08/11/2024 04:40 AM    MCH 28.7 08/11/2024 04:40 AM    MCHC 31.9 (L) 08/11/2024 04:40 AM    MPV 9.2 08/11/2024 04:40 AM    NEUTSPOLYS 84.30 (H) 08/11/2024 04:40 AM    LYMPHOCYTES 9.60 (L) 08/11/2024 04:40 AM    MONOCYTES 5.00 08/11/2024 04:40 AM    EOSINOPHILS 0.00 08/11/2024 04:40 AM    BASOPHILS 0.40 08/11/2024 04:40 AM     Lab Results   Component Value Date/Time    SODIUM 136 08/11/2024 04:40 AM    POTASSIUM 4.6 08/11/2024 04:40 AM    CHLORIDE  105 08/11/2024 04:40 AM    CO2 19 (L) 08/11/2024 04:40 AM    GLUCOSE 122 (H) 08/11/2024 04:40 AM    BUN 9 08/11/2024 04:40 AM    CREATININE 0.62 08/11/2024 04:40 AM     Lab Results   Component Value Date/Time    PROTHROMBTM 14.1 08/10/2024 01:33 AM    INR 1.08 08/10/2024 01:33 AM           PATHOLOGY: Final pathology demonstrated acute on chronic cholecystitis without evidence of malignancy or dysplasia.    ASSESSMENT AND PLAN:  Satisfactory progress following a laparoscopic cholecystectomy.  Final postoperative instructions provided. Lifting restrictions reviewed. Discharge from the [facility].       ____________________________________     YAZ Colbert    DD: 8/22/2024  11:02 AM

## 2024-08-23 ENCOUNTER — OFFICE VISIT (OUTPATIENT)
Dept: SURGERY | Facility: MEDICAL CENTER | Age: 84
End: 2024-08-23
Attending: SURGERY

## 2024-08-23 VITALS
DIASTOLIC BLOOD PRESSURE: 76 MMHG | HEART RATE: 78 BPM | WEIGHT: 166 LBS | OXYGEN SATURATION: 94 % | RESPIRATION RATE: 16 BRPM | SYSTOLIC BLOOD PRESSURE: 120 MMHG

## 2024-08-23 DIAGNOSIS — Z09 FOLLOW-UP EXAM: ICD-10-CM

## 2024-08-23 PROCEDURE — 99024 POSTOP FOLLOW-UP VISIT: CPT | Performed by: REGISTERED NURSE

## 2024-08-23 ASSESSMENT — FIBROSIS 4 INDEX: FIB4 SCORE: 0.93

## (undated) DEVICE — SUCTION INSTRUMENT YANKAUER BULBOUS TIP W/O VENT (50EA/CA)

## (undated) DEVICE — SUTURE 0 VICRYL PLUS UR-6 - 27 INCH (36/BX)

## (undated) DEVICE — GUIDEWARE ENDOSCOPIC JAGWIRE REVOLUTION RX 39 SPHINCTEROTOME (1EA)

## (undated) DEVICE — TROCAR 5X100 NON BLADED Z-TH - READ KII (6/BX)

## (undated) DEVICE — ELECTRODE DUAL RETURN W/ CORD - (50/PK)

## (undated) DEVICE — SET SUCTION/IRRIGATION WITH DISPOSABLE TIP (6/CA )PART #0250-070-520 IS A SUB

## (undated) DEVICE — KIT ANESTHESIA W/CIRCUIT & 3/LT BAG W/FILTER (20EA/CA)

## (undated) DEVICE — TUBE CONNECTING SUCTION - CLEAR PLASTIC STERILE 72 IN (50EA/CA)

## (undated) DEVICE — SET LEADWIRE 5 LEAD BEDSIDE DISPOSABLE ECG (1SET OF 5/EA)

## (undated) DEVICE — CANISTER SUCTION 3000ML MECHANICAL FILTER AUTO SHUTOFF MEDI-VAC NONSTERILE LF DISP (40EA/CA)

## (undated) DEVICE — TUBE E-T HI-LO CUFF 6.5MM (10EA/BX)

## (undated) DEVICE — COVER ENDOSCOPE DISTAL SINGLE USE (20EA/BX)

## (undated) DEVICE — PACK LAP CHOLE OR - (2EA/CA)

## (undated) DEVICE — CHLORAPREP 26 ML APPLICATOR - ORANGE TINT(25/CA)

## (undated) DEVICE — COVER LIGHT HANDLE ALC PLUS DISP (18EA/BX)

## (undated) DEVICE — PORT AUXILLARY WATER (50EA/BX)

## (undated) DEVICE — GLOVE SZ 7 BIOGEL PI MICRO - PF LF (50PR/BX 4BX/CA)

## (undated) DEVICE — NEPTUNE 4 PORT MANIFOLD - (20/PK)

## (undated) DEVICE — DRAPESURG STERI-DRAPE LONG - (10/BX 4BX/CA)

## (undated) DEVICE — TROCAR Z THREAD12MM OPTICAL - NON BLADED (6/BX)

## (undated) DEVICE — TOWEL STOP TIMEOUT SAFETY FLAG (40EA/CA)

## (undated) DEVICE — KIT CUSTOM PROCEDURE SINGLE FOR ENDO (15/CA)

## (undated) DEVICE — DRAPE X LONG COILED INSTRUMENT ORGANIZER EUS (20EA/BX)

## (undated) DEVICE — SYRINGE 10 ML CONTROL LL (25EA/BX 4BX/CA)

## (undated) DEVICE — ELECTRODE 850 FOAM ADHESIVE - HYDROGEL RADIOTRNSPRNT (50/PK)

## (undated) DEVICE — SYRINGE DISP. 60 CC LL - (30/BX, 12BX/CA)**WHEN THESE ARE GONE ORDER #500206**

## (undated) DEVICE — CANISTER SUCTION RIGID RED 1500CC (40EA/CA)

## (undated) DEVICE — BUTTON ENDOSCOPY DISPOSABLE

## (undated) DEVICE — SODIUM CHL IRRIGATION 0.9% 1000ML (12EA/CA)

## (undated) DEVICE — SUTURE 4-0 VICRYL PLUS FS-2 - 27 INCH (36/BX)

## (undated) DEVICE — SENSOR OXIMETER ADULT SPO2 RD SET (20EA/BX)

## (undated) DEVICE — WATER IRRIGATION STERILE 1000ML (12EA/CA)

## (undated) DEVICE — SUTURE GENERAL

## (undated) DEVICE — LACTATED RINGERS INJ 1000 ML - (14EA/CA 60CA/PF)

## (undated) DEVICE — GOWN WARMING STANDARD FLEX - (30/CA)

## (undated) DEVICE — MASK OXYGEN VNYL ADLT MED CONC WITH 7 FOOT TUBING - (50EA/CA)

## (undated) DEVICE — CANNULA W/SEAL 5X100 Z-THRE - ADED KII (12/BX)

## (undated) DEVICE — TUBING CLEARLINK DUO-VENT - C-FLO (48EA/CA)

## (undated) DEVICE — BLOCK BITE MAXI DENTAL RETENTION RIM (100EA/BX)

## (undated) DEVICE — DEVICE BIOPSY RX BILIARY SYSTEM CAP (10EA/BX)

## (undated) DEVICE — BAG RETRIEVAL 10ML (10EA/BX)

## (undated) DEVICE — DERMABOND ADVANCED - (12EA/BX)

## (undated) DEVICE — GLOVE BIOGEL PI INDICATOR SZ 7.5 SURGICAL PF LF -(50/BX 4BX/CA)

## (undated) DEVICE — AUTOCAP RX FOR EXALT SCOPES (10EA/BX)

## (undated) DEVICE — BALLOON RETRIEVAL EXTRACTOR PRO RX 9-12MM

## (undated) DEVICE — FILM CASSETTE ENDO

## (undated) DEVICE — CLIP MED LG INTNL HRZN TI ESCP - (20/BX)

## (undated) DEVICE — SET EXTENSION WITH 2 PORTS (48EA/CA) ***PART #2C8610 IS A SUBSTITUTE*****